# Patient Record
Sex: FEMALE | Race: WHITE | NOT HISPANIC OR LATINO | Employment: FULL TIME | URBAN - METROPOLITAN AREA
[De-identification: names, ages, dates, MRNs, and addresses within clinical notes are randomized per-mention and may not be internally consistent; named-entity substitution may affect disease eponyms.]

---

## 2017-08-02 ENCOUNTER — ALLSCRIPTS OFFICE VISIT (OUTPATIENT)
Dept: OTHER | Facility: OTHER | Age: 27
End: 2017-08-02

## 2017-10-18 LAB — EXTERNAL HIV SCREEN: NORMAL

## 2018-01-14 VITALS
BODY MASS INDEX: 30.4 KG/M2 | SYSTOLIC BLOOD PRESSURE: 122 MMHG | HEART RATE: 100 BPM | HEIGHT: 66 IN | WEIGHT: 189.13 LBS | DIASTOLIC BLOOD PRESSURE: 70 MMHG | RESPIRATION RATE: 16 BRPM

## 2018-12-27 ENCOUNTER — OFFICE VISIT (OUTPATIENT)
Dept: URGENT CARE | Facility: MEDICAL CENTER | Age: 28
End: 2018-12-27
Payer: COMMERCIAL

## 2018-12-27 VITALS
DIASTOLIC BLOOD PRESSURE: 60 MMHG | TEMPERATURE: 99.2 F | HEIGHT: 65 IN | BODY MASS INDEX: 29.16 KG/M2 | SYSTOLIC BLOOD PRESSURE: 110 MMHG | HEART RATE: 124 BPM | RESPIRATION RATE: 20 BRPM | OXYGEN SATURATION: 98 % | WEIGHT: 175 LBS

## 2018-12-27 DIAGNOSIS — J06.9 UPPER RESPIRATORY TRACT INFECTION, UNSPECIFIED TYPE: Primary | ICD-10-CM

## 2018-12-27 PROCEDURE — 99213 OFFICE O/P EST LOW 20 MIN: CPT | Performed by: PHYSICIAN ASSISTANT

## 2018-12-27 RX ORDER — NORETHINDRONE ACETATE AND ETHINYL ESTRADIOL 1MG-20(24)
1 KIT ORAL DAILY
Refills: 0 | COMMUNITY
Start: 2018-12-19

## 2018-12-27 RX ORDER — BENZONATATE 200 MG/1
200 CAPSULE ORAL 3 TIMES DAILY PRN
Qty: 20 CAPSULE | Refills: 0 | Status: SHIPPED | OUTPATIENT
Start: 2018-12-27

## 2018-12-27 NOTE — PATIENT INSTRUCTIONS
1  Motrin as needed for fever  2  Increase fluids  3  Take Tessalon every 8 hours as needed for cough  4   Follow up with PCP in 3-5 days if symptoms persist

## 2018-12-27 NOTE — PROGRESS NOTES
[facility]        NAME: Emmanuel Briggs is a 34 y o  female  : 1990    MRN: [id number]  DATE: 2018  TIME: 8:39 AM    Assessment and Plan   Upper respiratory tract infection, unspecified type [J06 9]  1  Upper respiratory tract infection, unspecified type  benzonatate (TESSALON) 200 MG capsule         Patient Instructions     1  Motrin as needed for fever  2  Increase fluids  3  Take Tessalon every 8 hours as needed for cough  4  Follow up with PCP in 3-5 days if symptoms persist     Chief Complaint     Chief Complaint   Patient presents with    URI     x3 days sinus pressure, cough , sore throat, runny nose and achy   phlgem is white         History of Present Illness       The patient is a 61-year-old female presents with a 3 day history of nasal discharge, cough, congestion and postnasal drip  She denies any fever, chills or body aches since the onset of her symptoms  Review of Systems   Review of Systems   Constitutional: Negative  HENT: Positive for congestion, postnasal drip, rhinorrhea and sinus pressure  Respiratory: Positive for cough  Gastrointestinal: Negative  Current Medications       Current Outpatient Prescriptions:     BLISOVI 24 FE 1-20 MG-MCG(24) per tablet, Take 1 tablet by mouth daily, Disp: , Rfl: 0    benzonatate (TESSALON) 200 MG capsule, Take 1 capsule (200 mg total) by mouth 3 (three) times a day as needed for cough, Disp: 20 capsule, Rfl: 0    Current Allergies     Allergies as of 2018 - Reviewed 2018   Allergen Reaction Noted    Cefaclor  2015    Ciprofloxacin  2015    Sulfa antibiotics  2012            The following portions of the patient's history were reviewed and updated as appropriate: allergies, current medications, past family history, past medical history, past social history, past surgical history and problem list      History reviewed  No pertinent past medical history  History reviewed   No pertinent surgical history  History reviewed  No pertinent family history  Medications have been verified  Objective   /60   Pulse (!) 124   Temp 99 2 °F (37 3 °C) (Temporal)   Resp 20   Ht 5' 5" (1 651 m)   Wt 79 4 kg (175 lb)   SpO2 98%   BMI 29 12 kg/m²        Physical Exam     Physical Exam   Constitutional: She appears well-developed and well-nourished  No distress  HENT:   Head: Normocephalic and atraumatic  Right Ear: Tympanic membrane and ear canal normal    Left Ear: Tympanic membrane and ear canal normal    Nose: Mucosal edema and rhinorrhea present  Mouth/Throat: Uvula is midline, oropharynx is clear and moist and mucous membranes are normal    Cardiovascular: Normal rate, regular rhythm and normal heart sounds  No murmur heard    Pulmonary/Chest: Effort normal and breath sounds normal

## 2019-11-20 NOTE — PROGRESS NOTES
Chart updated per office request  Discrete reportable data documented      Updated:    HIV Screening, DOS  10-18-17    Pap(s), DOS  10-18-17

## 2020-01-14 ENCOUNTER — OFFICE VISIT (OUTPATIENT)
Dept: URGENT CARE | Facility: CLINIC | Age: 30
End: 2020-01-14
Payer: COMMERCIAL

## 2020-01-14 VITALS
BODY MASS INDEX: 27.7 KG/M2 | DIASTOLIC BLOOD PRESSURE: 90 MMHG | RESPIRATION RATE: 16 BRPM | HEIGHT: 69 IN | TEMPERATURE: 98.8 F | SYSTOLIC BLOOD PRESSURE: 132 MMHG | OXYGEN SATURATION: 97 % | WEIGHT: 187 LBS | HEART RATE: 120 BPM

## 2020-01-14 DIAGNOSIS — J01.00 ACUTE MAXILLARY SINUSITIS, RECURRENCE NOT SPECIFIED: Primary | ICD-10-CM

## 2020-01-14 PROCEDURE — 99213 OFFICE O/P EST LOW 20 MIN: CPT | Performed by: PHYSICIAN ASSISTANT

## 2020-01-14 RX ORDER — BROMPHENIRAMINE MALEATE, PSEUDOEPHEDRINE HYDROCHLORIDE, AND DEXTROMETHORPHAN HYDROBROMIDE 2; 30; 10 MG/5ML; MG/5ML; MG/5ML
10 SYRUP ORAL 4 TIMES DAILY PRN
Qty: 118 ML | Refills: 0 | Status: SHIPPED | OUTPATIENT
Start: 2020-01-14

## 2020-01-14 RX ORDER — AMOXICILLIN AND CLAVULANATE POTASSIUM 875; 125 MG/1; MG/1
1 TABLET, FILM COATED ORAL EVERY 12 HOURS SCHEDULED
Qty: 14 TABLET | Refills: 0 | Status: SHIPPED | OUTPATIENT
Start: 2020-01-14 | End: 2020-01-21

## 2020-01-14 NOTE — PROGRESS NOTES
[facility]        NAME: Jose Luis Kern is a 34 y o  female  : 1990    MRN: [id number]  DATE: 2020  TIME: 7:13 PM    Assessment and Plan   Acute maxillary sinusitis, recurrence not specified [J01 00]  1  Acute maxillary sinusitis, recurrence not specified  amoxicillin-clavulanate (AUGMENTIN) 875-125 mg per tablet    brompheniramine-pseudoephedrine-DM 30-2-10 MG/5ML syrup         Patient Instructions   The patient does not want influenza testing  Prescription sent to the pharmacy for Augmentin and Bromfed-use as directed  Nasal spray daily, Flonase a m  mist humidifier, Tylenol/ibuprofen as needed for fever pain  Follow up with PCP in 3-5 days  Proceed to  ER if symptoms worsen  Chief Complaint     Chief Complaint   Patient presents with    Flu Symptoms     pt reports having flu like symptoms that started on SAt- she reports having a bad cough iwth body aches and possible fever- and sore throat         History of Present Illness   The patient is a 53-year-old female who presents with cold symptoms that started 3-4 days ago  Gradual onset of symptoms  Positive headache  Positive nasal and sinus congestion that has worsened  Frontal and maxillary sinus pain and pressure  Bilateral ear pain without drainage or hearing changes  Negative fever  Positive myalgias  Positive sore throat  Nonproductive cough without shortness of breath or wheezing  She did not receive her influenza vaccine this season  HPI    Review of Systems   Review of Systems   Constitutional: Negative for activity change, chills, fatigue and fever  HENT: Positive for congestion, postnasal drip, rhinorrhea, sinus pressure, sinus pain and sore throat  Negative for ear discharge, ear pain, facial swelling, mouth sores, sneezing and trouble swallowing  Eyes: Negative for pain, discharge, redness and itching  Respiratory: Positive for cough   Negative for apnea, chest tightness, shortness of breath, wheezing and stridor  Cardiovascular: Negative for chest pain  Gastrointestinal: Negative for abdominal distention, abdominal pain, diarrhea, nausea and vomiting  Genitourinary: Negative for difficulty urinating  Musculoskeletal: Positive for myalgias  Negative for arthralgias  Skin: Negative for pallor and rash  Allergic/Immunologic: Negative  Neurological: Positive for headaches  Negative for dizziness and light-headedness  Hematological: Negative  Psychiatric/Behavioral: Negative  All other systems reviewed and are negative  Current Medications       Current Outpatient Medications:     BLISOVI 24 FE 1-20 MG-MCG(24) per tablet, Take 1 tablet by mouth daily, Disp: , Rfl: 0    amoxicillin-clavulanate (AUGMENTIN) 875-125 mg per tablet, Take 1 tablet by mouth every 12 (twelve) hours for 7 days, Disp: 14 tablet, Rfl: 0    benzonatate (TESSALON) 200 MG capsule, Take 1 capsule (200 mg total) by mouth 3 (three) times a day as needed for cough (Patient not taking: Reported on 1/14/2020), Disp: 20 capsule, Rfl: 0    brompheniramine-pseudoephedrine-DM 30-2-10 MG/5ML syrup, Take 10 mL by mouth 4 (four) times a day as needed for congestion, Disp: 118 mL, Rfl: 0    Current Allergies     Allergies as of 01/14/2020 - Reviewed 01/14/2020   Allergen Reaction Noted    Cefaclor  03/12/2015    Ciprofloxacin  03/12/2015    Sulfa antibiotics  03/08/2012            The following portions of the patient's history were reviewed and updated as appropriate: allergies, current medications, past family history, past medical history, past social history, past surgical history and problem list      History reviewed  No pertinent past medical history  History reviewed  No pertinent surgical history  No family history on file  Medications have been verified          Objective   /90   Pulse (!) 120   Temp 98 8 °F (37 1 °C)   Resp 16   Ht 5' 9" (1 753 m)   Wt 84 8 kg (187 lb)   LMP 01/11/2020 SpO2 97%   BMI 27 62 kg/m²        Physical Exam     Physical Exam   Constitutional: She is oriented to person, place, and time  She appears well-developed and well-nourished  Non-toxic appearance  She does not have a sickly appearance  She appears ill  No distress  HENT:   Head: Normocephalic and atraumatic  Right Ear: Hearing, tympanic membrane, external ear and ear canal normal  No drainage or tenderness  Tympanic membrane is not perforated, not erythematous, not retracted and not bulging  No middle ear effusion  No decreased hearing is noted  Left Ear: Hearing, tympanic membrane, external ear and ear canal normal  No drainage or tenderness  Tympanic membrane is not perforated, not erythematous, not retracted and not bulging  No middle ear effusion  No decreased hearing is noted  Nose: Mucosal edema and rhinorrhea present  No septal deviation  Right sinus exhibits maxillary sinus tenderness and frontal sinus tenderness  Left sinus exhibits maxillary sinus tenderness and frontal sinus tenderness  Mouth/Throat: Uvula is midline and mucous membranes are normal  No oral lesions  No dental abscesses or uvula swelling  Posterior oropharyngeal erythema present  No oropharyngeal exudate, posterior oropharyngeal edema or tonsillar abscesses  No tonsillar exudate  Eyes: Pupils are equal, round, and reactive to light  Conjunctivae and EOM are normal    Neck: Trachea normal, normal range of motion and full passive range of motion without pain  Neck supple  No JVD present  No edema and no erythema present  No thyromegaly present  Cardiovascular: Normal rate, regular rhythm, S1 normal, S2 normal, normal heart sounds, intact distal pulses and normal pulses  No murmur heard  Pulmonary/Chest: Effort normal and breath sounds normal  No accessory muscle usage or stridor  No tachypnea  No respiratory distress  She has no decreased breath sounds  She has no wheezes  She has no rhonchi  She has no rales  Abdominal: Soft  Normal appearance and bowel sounds are normal  She exhibits no distension  There is no hepatosplenomegaly  There is no tenderness  Musculoskeletal: Normal range of motion  She exhibits no edema  Neurological: She is alert and oriented to person, place, and time  Skin: Skin is warm, dry and intact  No abrasion, no lesion and no rash noted  She is not diaphoretic  No cyanosis or erythema  Nails show no clubbing  Psychiatric: She has a normal mood and affect  Her speech is normal and behavior is normal    Nursing note and vitals reviewed

## 2020-01-14 NOTE — PATIENT INSTRUCTIONS
Prescription sent to the pharmacy for Augmentin and Bromfed-use as directed  Nasal spray daily, Flonase a m  mist humidifier, Tylenol/ibuprofen as needed for fever pain  Follow up with PCP in 3-5 days  Proceed to  ER if symptoms worsen  Rhinosinusitis   AMBULATORY CARE:   Rhinosinusitis (RS)  is inflammation of your nose and sinuses  It commonly begins as a virus, often as a common cold  Viruses usually last 7 to 10 days and do not need treatment  When the virus does not get better on its own, you may have bacterial RS  This means that bacteria have begun to grow inside your sinuses  Acute RS lasts less than 4 weeks  Chronic RS lasts 12 weeks or more  Recurrent RS is when you have 4 or more episodes of RS in one year  Your signs and symptoms  may be worse when you lie on your back or try to sleep  You may have any of the following:  · Stuffy nose and reduced sense of smell     · Runny nose with thick yellow or green mucus     · Pressure or pain on your face or a headache     · Pain in your teeth or bad breath     · Ear pain or pressure     · Fever or cough     · Tiredness  Seek care immediately if:   · You have double vision or you cannot see  · You have a stiff neck, a fever, or a bad headache  · Your eyeball bulges out or you cannot move your eye  · Your eye and eyelid are red, swollen, and painful  · You cannot open your eye  · You are more sleepy than normal, or you notice changes in your ability to think, move, or talk  · You have swelling of your forehead or scalp  Contact your healthcare provider if:   · Your symptoms are worse or do not improve after 3 to 5 days of treatment  · You have questions or concerns about your condition or care  Treatment for rhinosinusitis  may include any of the following:  · Acetaminophen  decreases pain and fever  It is available without a doctor's order  Ask how much to take and how often to take it  Follow directions   Acetaminophen can cause liver damage if not taken correctly  · NSAIDs , such as ibuprofen, help decrease swelling, pain, and fever  This medicine is available with or without a doctor's order  NSAIDs can cause stomach bleeding or kidney problems in certain people  If you take blood thinner medicine, always ask your healthcare provider if NSAIDs are safe for you  Always read the medicine label and follow directions  · Nasal steroid sprays  decrease inflammation in your nose and sinuses  · Decongestants  reduce swelling and drain mucus in the nose and sinuses  They may help you breathe easier  · Antihistamines  dry mucus in the nose and relieve sneezing  · Antibiotics  treat a bacterial infection and may be needed if your symptoms do not improve or they get worse  · Take your medicine as directed  Contact your healthcare provider if you think your medicine is not helping or if you have side effects  Tell him or her if you are allergic to any medicine  Keep a list of the medicines, vitamins, and herbs you take  Include the amounts, and when and why you take them  Bring the list or the pill bottles to follow-up visits  Carry your medicine list with you in case of an emergency  Self-care:   · Rinse your sinuses  Use a sinus rinse device to rinse your nasal passages with a saline (salt water) solution  This will help thin the mucus in your nose and rinse away pollen and dirt  It will also help reduce swelling so you can breathe normally  Ask your healthcare provider how often to do this  · Breathe in steam   Heat a bowl of water until you see steam  Lean over the bowl and make a tent over your head with a large towel  Breathe deeply for about 20 minutes  Be careful not to get too close to the steam or burn yourself  Do this 3 times a day  You can also breathe deeply when you take a hot shower  · Sleep with your head elevated    Place an extra pillow under your head before you go to sleep to help your sinuses drain      · Drink liquids as directed  Ask your healthcare provider how much liquid to drink each day and which liquids are best for you  Liquids will thin the mucus in your nose and help it drain  Avoid drinks that contain alcohol or caffeine  · Do not smoke, and avoid secondhand smoke  Nicotine and other chemicals in cigarettes and cigars can make your symptoms worse  Ask your healthcare provider for information if you currently smoke and need help to quit  E-cigarettes or smokeless tobacco still contain nicotine  Talk to your healthcare provider before you use these products  Follow up with your healthcare provider as directed: Follow up if your symptoms are worse or not better after 3 to 5 days of treatment  Write down your questions so you remember to ask them during your visits  © 2017 [facility] [facility] Information is for End User's use only and may not be sold, redistributed or otherwise used for commercial purposes  All illustrations and images included in CareNotes® are the copyrighted property of A D A M [employer] Inc  or [employer]  The above information is an  only  It is not intended as medical advice for individual conditions or treatments  Talk to your doctor, nurse or pharmacist before following any medical regimen to see if it is safe and effective for you

## 2021-08-17 ENCOUNTER — OFFICE VISIT (OUTPATIENT)
Dept: URGENT CARE | Facility: MEDICAL CENTER | Age: 31
End: 2021-08-17
Payer: COMMERCIAL

## 2021-08-17 VITALS
HEIGHT: 69 IN | HEART RATE: 104 BPM | BODY MASS INDEX: 27.62 KG/M2 | SYSTOLIC BLOOD PRESSURE: 118 MMHG | DIASTOLIC BLOOD PRESSURE: 74 MMHG | TEMPERATURE: 98.5 F | RESPIRATION RATE: 18 BRPM
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
## 2021-08-17 PROCEDURE — 90715 TDAP VACCINE 7 YRS/> IM: CPT

## 2021-08-17 PROCEDURE — 90471 IMMUNIZATION ADMIN: CPT | Performed by: PHYSICIAN ASSISTANT

## 2021-08-17 PROCEDURE — 99213 OFFICE O/P EST LOW 20 MIN: CPT | Performed by: PHYSICIAN ASSISTANT

## 2021-08-18 NOTE — PROGRESS NOTES
[facility]        NAME: Kenn Mejia is a 36 y o  female  : 1990    MRN: [id number]  DATE: 2021  TIME: 9:01 PM    Assessment and Plan   Cat bite, initial encounter Azalee Leventhal  1  Cat bite, initial encounter  Tdap Vaccine greater than or equal to 8yo      There is no puncture wound noted, only superficial scratch on the skin  No need for rabies vaccination at this time  Patient does want to update tetanus as she has not had one in a very long time  Patient Instructions     Tylenol or Motrin for pain   keep wound clean and dry  Follow up with PCP in 3-5 days  Proceed to  ER if symptoms worsen  Chief Complaint     Chief Complaint   Patient presents with    Animal Bite     cat bite to left hand         History of Present Illness        Patient is a 72-year-old female who presents today with complaints of cat bite to left hand that occurred yesterday  She denies any significant swelling or pain  Tetanus is not up-to-date and this is what patient came here for  No actual break in the skin, just superficial scratch  Review of Systems   Review of Systems   Constitutional: Negative for fever  Respiratory: Negative for shortness of breath  Cardiovascular: Negative for chest pain  Musculoskeletal: Positive for myalgias  Skin: Positive for wound  Negative for color change           Current Medications       Current Outpatient Medications:     BLISOVI 24 FE 1-20 MG-MCG(24) per tablet, Take 1 tablet by mouth daily, Disp: , Rfl: 0    benzonatate (TESSALON) 200 MG capsule, Take 1 capsule (200 mg total) by mouth 3 (three) times a day as needed for cough (Patient not taking: Reported on 2021), Disp: 20 capsule, Rfl: 0    brompheniramine-pseudoephedrine-DM 30-2-10 MG/5ML syrup, Take 10 mL by mouth 4 (four) times a day as needed for congestion (Patient not taking: Reported on 2021), Disp: 118 mL, Rfl: 0    Current Allergies     Allergies as of 2021 - Reviewed 08/17/2021   Allergen Reaction Noted    Cefaclor  03/12/2015    Ciprofloxacin  03/12/2015    Sulfa antibiotics  03/08/2012            The following portions of the patient's history were reviewed and updated as appropriate: allergies, current medications, past family history, past medical history, past social history, past surgical history and problem list      History reviewed  No pertinent past medical history  Past Surgical History:   Procedure Laterality Date    TONSILLECTOMY         Family History   Problem Relation Age of Onset    Hodgkin's lymphoma Brother          Medications have been verified  Objective   /74   Pulse 104   Temp 98 5 °F (36 9 °C)   Resp 18   Ht 5' 9" (1 753 m)   BMI 27 62 kg/m²        Physical Exam     Physical Exam  Constitutional:       General: She is not in acute distress  Appearance: Normal appearance  She is not ill-appearing  Cardiovascular:      Rate and Rhythm: Normal rate and regular rhythm  Pulmonary:      Effort: Pulmonary effort is normal    Musculoskeletal:         General: Normal range of motion  Skin:     General: Skin is warm and dry  Findings: Wound present  No erythema  Neurological:      Mental Status: She is alert

## 2024-09-18 ENCOUNTER — OFFICE VISIT (OUTPATIENT)
Age: 34
End: 2024-09-18
Payer: COMMERCIAL

## 2024-09-18 ENCOUNTER — PREP FOR PROCEDURE (OUTPATIENT)
Age: 34
End: 2024-09-18

## 2024-09-18 VITALS
DIASTOLIC BLOOD PRESSURE: 80 MMHG | HEART RATE: 85 BPM | HEIGHT: 66 IN | OXYGEN SATURATION: 96 % | SYSTOLIC BLOOD PRESSURE: 104 MMHG | WEIGHT: 174 LBS | BODY MASS INDEX: 27.97 KG/M2

## 2024-09-18 DIAGNOSIS — R10.9 ABDOMINAL PAIN, UNSPECIFIED ABDOMINAL LOCATION: ICD-10-CM

## 2024-09-18 DIAGNOSIS — K62.5 RECTAL BLEEDING: ICD-10-CM

## 2024-09-18 DIAGNOSIS — K59.00 CONSTIPATION, UNSPECIFIED CONSTIPATION TYPE: Primary | ICD-10-CM

## 2024-09-18 PROCEDURE — 99204 OFFICE O/P NEW MOD 45 MIN: CPT | Performed by: PHYSICIAN ASSISTANT

## 2024-09-18 RX ORDER — NORGESTIMATE AND ETHINYL ESTRADIOL 0.25-0.035
1 KIT ORAL DAILY
COMMUNITY
Start: 2024-09-04 | End: 2024-09-20

## 2024-09-18 NOTE — PATIENT INSTRUCTIONS
Scheduled date of colonoscopy (as of today): 10/4/24  Physician performing colonoscopy: Kellee  Location of colonoscopy: [place]  Bowel prep reviewed with patient: Miralax  Instructions reviewed with patient by: Nerissa JARAMILLO  Clearances:

## 2024-09-18 NOTE — PROGRESS NOTES
[facility] - Outpatient Consultation  Melvi Morrison 34 y.o. female MRN: [id number]  Encounter: [id number]          ASSESSMENT AND PLAN:      1. Constipation  2. Abdominal pain  3. Rectal bleeding    Patient presents for an evaluation of intermittent constipation, abdominal discomfort, and bloating.  She also has had episodes of rectal bleeding.  She had a recent normal CBC and TSH level.  She reports her father has had colon polyps.  She recently started a probiotic.    Will plan for a colonoscopy to investigate.  Will check celiac serology.  Recommend to begin a fiber supplement like Benefiber daily and continue the probiotic course.  Trial of a low FODMAP diet (information sheet provided to patient).      ______________________________________________________________________    HPI:  Patient is a very pleasant 34 year old female who presents to the office for a gastrointestinal evaluation.  Patient reports over the past 3 years or so she has noticed more issues with her bowel movements.  Over the past couple of months symptoms have increased.  She reports of intermittent constipation.  She reports she can have a number of days with regular bowel movements but then can have a period where she does not go for a few days.  She also reports of abdominal discomfort and bloating.  She has had rectal bleeding at times.  She has never had a colonoscopy.  She reports she was put on an antibiotic course for UTI recently.  She then started a probiotic.  She reports a history of hemorrhoids in the past.  She reports a father with colon polyps.  No family history of colon cancer.  No family history of ulcerative colitis, Crohn's disease, or celiac disease.  She reports she wishes to try to avoid any prescription medications.  She reports adequate water intake.    I discussed informed consent with the patient for the colonoscopy. The risks/benefits/alternatives of the procedure were discussed with the  "patient. Risks included, but not limited to, infection, bleeding, perforation, injury to organs in the abdomen were discussed. Patient was agreeable.       REVIEW OF SYSTEMS:    CONSTITUTIONAL: Denies any fever, chills, rigors, and weight loss.  HEENT: No earache or tinnitus. Denies hearing loss or visual disturbances.  CARDIOVASCULAR: No chest pain or palpitations.   RESPIRATORY: Denies any cough, hemoptysis, shortness of breath or dyspnea on exertion.  GASTROINTESTINAL: As noted in the History of Present Illness.   GENITOURINARY: No problems with urination. Denies any hematuria or dysuria.  NEUROLOGIC: No dizziness or vertigo, denies headaches.   MUSCULOSKELETAL: Denies any muscle or joint pain.   SKIN: Denies skin rashes or itching.   ENDOCRINE: Denies excessive thirst. Denies intolerance to heat or cold.  PSYCHOSOCIAL: Denies depression or anxiety. Denies any recent memory loss.       Historical Information   History reviewed. No pertinent past medical history.  Past Surgical History:   Procedure Laterality Date    RETINAL DETACHMENT REPAIR W/ SCLERAL BUCKLE LE Right 2020    TONSILLECTOMY       Social History   Social History     Substance and Sexual Activity   Alcohol Use Never     Social History     Substance and Sexual Activity   Drug Use Never     Social History     Tobacco Use   Smoking Status Never   Smokeless Tobacco Never     Family History   Problem Relation Age of Onset    Hodgkin's lymphoma Brother        Meds/Allergies       Current Outpatient Medications:     benzonatate (TESSALON) 200 MG capsule    brompheniramine-pseudoephedrine-DM 30-2-10 MG/5ML syrup    norgestimate-ethinyl estradiol (ORTHO-CYCLEN) 0.25-35 MG-MCG per tablet    Allergies   Allergen Reactions    Cefaclor      Other reaction(s): hives    Ciprofloxacin      Other reaction(s): hives    Sulfa Antibiotics            Objective     Blood pressure 104/80, pulse 85, height 5' 6\" (1.676 m), weight 78.9 kg (174 lb), SpO2 96%. Body mass index " is 28.08 kg/m².        PHYSICAL EXAM:      General Appearance:   Alert, cooperative, no distress   HEENT:   Normocephalic, atraumatic, anicteric.     Neck:  Supple, symmetrical, trachea midline   Lungs:   Clear to auscultation bilaterally; no rales, rhonchi or wheezing; respirations unlabored    Heart::   Regular rate and rhythm; no murmur, rub, or gallop.   Abdomen:   Soft, non-tender, non-distended; normal bowel sounds; no masses, no organomegaly    Genitalia:   Deferred    Rectal:   Deferred    Extremities:  No cyanosis, clubbing or edema    Pulses:  2+ and symmetric    Skin:  No jaundice, rashes, or lesions    Lymph nodes:  No palpable cervical lymphadenopathy        Lab Results:   No visits with results within 1 Day(s) from this visit.   Latest known visit with results is:   Orders Only on 11/20/2019   Component Date Value    HIV Screen 10/18/2017 Non-Reactive          Radiology Results:   No results found.

## 2024-10-02 ENCOUNTER — APPOINTMENT (EMERGENCY)
Dept: RADIOLOGY | Facility: HOSPITAL | Age: 34
End: 2024-10-02
Payer: COMMERCIAL

## 2024-10-02 ENCOUNTER — TELEPHONE (OUTPATIENT)
Age: 34
End: 2024-10-02

## 2024-10-02 ENCOUNTER — HOSPITAL ENCOUNTER (EMERGENCY)
Facility: HOSPITAL | Age: 34
Discharge: HOME | End: 2024-10-02
Payer: COMMERCIAL

## 2024-10-02 VITALS
RESPIRATION RATE: 18 BRPM | DIASTOLIC BLOOD PRESSURE: 98 MMHG | SYSTOLIC BLOOD PRESSURE: 143 MMHG | TEMPERATURE: 98 F | HEART RATE: 93 BPM

## 2024-10-02 DIAGNOSIS — Z23 NEED FOR RABIES VACCINATION: ICD-10-CM
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
## 2024-10-02 PROCEDURE — 90375 RABIES IG IM/SC: CPT

## 2024-10-02 PROCEDURE — 99284 EMERGENCY DEPT VISIT MOD MDM: CPT

## 2024-10-02 PROCEDURE — 99283 EMERGENCY DEPT VISIT LOW MDM: CPT

## 2024-10-02 PROCEDURE — 90471 IMMUNIZATION ADMIN: CPT

## 2024-10-02 PROCEDURE — 96372 THER/PROPH/DIAG INJ SC/IM: CPT

## 2024-10-02 PROCEDURE — 90675 RABIES VACCINE IM: CPT

## 2024-10-02 PROCEDURE — 73090 X-RAY EXAM OF FOREARM: CPT

## 2024-10-02 RX ORDER — ONDANSETRON 4 MG/1
4 TABLET, FILM COATED ORAL EVERY 6 HOURS
Qty: 12 TABLET | Refills: 0 | Status: SHIPPED | OUTPATIENT
Start: 2024-10-02

## 2024-10-02 RX ADMIN — AMOXICILLIN AND CLAVULANATE POTASSIUM 1 TABLET: 875; 125 TABLET, FILM COATED ORAL at 01:21

## 2024-10-02 RX ADMIN — RABIES VIRUS STRAIN PM-1503-3M ANTIGEN (PROPIOLACTONE INACTIVATED) AND WATER 1 ML: KIT at 01:12

## 2024-10-02 RX ADMIN — RABIES IMMUNE GLOBULIN (HUMAN) 1500 UNITS: 300 INJECTION, SOLUTION INFILTRATION; INTRAMUSCULAR at 01:12

## 2024-10-02 NOTE — DISCHARGE INSTRUCTIONS
Keep wound covered with gauze wrap.     If noticing redness, swelling, pus discharge from wound, or swelling of fingers, inability to move fingers, or redness tracking up the arm, return to the ED for evaluation immediately as these are signs of infection.    Take antibiotic as prescribed with food.    Tylenol and Motrin for pain control.    Return on days 3,7,14 for repeat rabies vaccinations: 10/5, 10/9, 10/16

## 2024-10-02 NOTE — TELEPHONE ENCOUNTER
Rescheduled date of colonoscopy (as of today): 11/01/2024  Physician performing colonoscopy: DR CHAVEZ  Location of colonoscopy: [place]  Bowel prep reviewed with patient: MIRALAX/DULCOLAX  Instructions reviewed with patient by: Previously reviewed with pt. Verified pt has all procedure instructions.  Clearances:         Patient rescheduled procedure due to recent cat bite. She is currently on antibiotics.

## 2024-10-02 NOTE — ED PROVIDER NOTES
Final diagnoses:   Cat bite, initial encounter   Need for rabies vaccination     ED Disposition       ED Disposition   Discharge    Condition   Stable    Date/Time   Wed Oct 2, 2024  1:25 AM    Comment   Melvi Morrison discharge to home/self care.                   Assessment & Plan       Medical Decision Making  Patient is a 34-year-old female presenting to the ED after being bitten by a feral cat on the right wrist.  History and clinical exam documented below.  Patient's wound was irrigated copiously with 1.5 L of saline.  Given there is no information on the vaccination status of the cat, advised vaccination with rabies series.  Tetanus status is up-to-date.  Patient is agreeable with the plan.  Will also treat the patient with Augmentin for infection prophylaxis.  An x-ray of the right forearm was taken to rule out any foreign bodies, and was ultimately negative.  Patient offered pain medication but declined in the ED.  Wounds were dressed with sterile gauze and Kerlix wrap.  Patient was counseled on cat bites being high infection risk.  Given strict return precautions and instructed on monitoring for infection.  Patient to return to the ED for repeat rabies vaccine on days 3, 7, 14. Rx Augmentin and Zofran.    I reviewed all testing with the patient:  I gave oral return precautions for what to return for in addition to the written return precautions.   The patient verbalized understanding of the discharge instructions and warnings that would necessitate return to the Emergency Department.  I specifically highlighted areas of special concern regarding the written and verbal discharge instructions and return precautions.    All questions were answered prior to discharge.      Amount and/or Complexity of Data Reviewed  Radiology: ordered and independent interpretation performed.    Risk  Prescription drug management.             Medications   rabies immune globulin, human (HyperRAB) injection 1,500 Units (1,500 Units  Infiltration Given 10/2/24 0112)   rabies vaccine, human diploid IM injection 1 mL (1 mL Intramuscular Given 10/2/24 0112)   amoxicillin-clavulanate (AUGMENTIN) 875-125 mg per tablet 1 tablet (1 tablet Oral Given 10/2/24 0121)       ED Risk Strat Scores                           SBIRT 20yo+      Flowsheet Row Most Recent Value   Initial Alcohol Screen: US AUDIT-C     1. How often do you have a drink containing alcohol? 0 Filed at: 10/02/2024 0055   2. How many drinks containing alcohol do you have on a typical day you are drinking?  0 Filed at: 10/02/2024 0055   3b. FEMALE Any Age, or MALE 65+: How often do you have 4 or more drinks on one occassion? 0 Filed at: 10/02/2024 0055   Audit-C Score 0 Filed at: 10/02/2024 0055   CLARA: How many times in the past year have you...    Used an illegal drug or used a prescription medication for non-medical reasons? Never Filed at: 10/02/2024 0055                            History of Present Illness       Chief Complaint   Patient presents with    Cat Bite     Bitten by cat at 10pm last night        History reviewed. No pertinent past medical history.   Past Surgical History:   Procedure Laterality Date    RETINAL DETACHMENT REPAIR W/ SCLERAL BUCKLE LE Right 2020    TONSILLECTOMY        Family History   Problem Relation Age of Onset    Hodgkin's lymphoma Brother       Social History     Tobacco Use    Smoking status: Never    Smokeless tobacco: Never   Vaping Use    Vaping status: Never Used   Substance Use Topics    Alcohol use: Never    Drug use: Never      E-Cigarette/Vaping    E-Cigarette Use Never User       E-Cigarette/Vaping Substances    Nicotine No     THC No     CBD No     Flavoring No     Other No     Unknown No       I have reviewed and agree with the history as documented.     HPI    Patient is a 34-year-old female with no significant past medical history, presenting to the ED for evaluation after being bitten by a feral cat about 1 hour prior to presentation.   Patient states that she was trying to rescue the, got into a cage, but that she was trying to reposition it, the cat bit her on the right volar wrist.  It subsequently ran away.  Patient has no information about who the cats owner is or whether or not is up-to-date on vaccinations.  Patient now reports some mild pain to the right wrist.  Denies fevers, chills.  No difficulty with movements of the right hand.  Patient is right-hand dominant.  Patient's tetanus status is up-to-date.    Review of Systems   All other systems reviewed and are negative.          Objective       ED Triage Vitals   Temperature Pulse Blood Pressure Respirations SpO2 Patient Position - Orthostatic VS   10/02/24 0057 10/02/24 0056 10/02/24 0056 10/02/24 0056 -- 10/02/24 0056   98 °F (36.7 °C) 93 143/98 18  Sitting      Temp Source Heart Rate Source BP Location FiO2 (%) Pain Score    10/02/24 0056 10/02/24 0056 10/02/24 0056 -- --    Oral Monitor Right arm        Vitals      Date and Time Temp Pulse SpO2 Resp BP Pain Score FACES Pain Rating User   10/02/24 0057 98 °F (36.7 °C) -- -- -- -- -- -- RD   10/02/24 0056 -- 93 -- 18 143/98 -- -- RD            Physical Exam  Vitals and nursing note reviewed.   Constitutional:       General: She is not in acute distress.     Appearance: Normal appearance. She is well-developed and normal weight. She is not ill-appearing, toxic-appearing or diaphoretic.   HENT:      Head: Normocephalic and atraumatic.      Right Ear: External ear normal.      Left Ear: External ear normal.      Nose: Nose normal. No congestion.      Mouth/Throat:      Mouth: Mucous membranes are moist.      Pharynx: Oropharynx is clear.   Eyes:      Conjunctiva/sclera: Conjunctivae normal.   Cardiovascular:      Rate and Rhythm: Normal rate and regular rhythm.   Pulmonary:      Effort: Pulmonary effort is normal. No respiratory distress.   Abdominal:      Palpations: Abdomen is soft.   Musculoskeletal:         General: No swelling.       Cervical back: Normal range of motion and neck supple.      Comments: Patient has 4-5 puncture wounds of the R volar wrist with mild soft tissue swelling at the distal radius. No fluctuance or induration. The area is not hot. There is no discharge from the area. Mild tenderness to palpation over the wound site.       Patient has no swelling of the fingers of the R hand. FDS and FDP intact of all fingers. Normal sensation in all relevant dermatomal distribution of the R hand. Normal thumb flexion, extension and opposition.    Able to flex and extend fingers without difficulty.    No evidence of lymphangitic streaking.     Skin:     General: Skin is warm and dry.      Capillary Refill: Capillary refill takes less than 2 seconds.   Neurological:      General: No focal deficit present.      Mental Status: She is alert and oriented to person, place, and time.      Sensory: No sensory deficit.      Motor: No weakness.      Gait: Gait normal.   Psychiatric:         Mood and Affect: Mood normal.         Results Reviewed       None            XR forearm 2 views RIGHT   ED Interpretation by Terrence Myers DO (10/02 0123)   No foreign body, no SQ gas as interpreted by me            Procedures    ED Medication and Procedure Management   Prior to Admission Medications   Prescriptions Last Dose Informant Patient Reported? Taking?   Norethin Ace-Eth Estrad-FE (BLISOVI 24 FE PO)   Yes No   Sig: Take by mouth      Facility-Administered Medications: None     Discharge Medication List as of 10/2/2024  1:34 AM        START taking these medications    Details   amoxicillin-clavulanate (AUGMENTIN) 875-125 mg per tablet Take 1 tablet by mouth every 12 (twelve) hours for 7 days, Starting Wed 10/2/2024, Until Wed 10/9/2024, Normal      ondansetron (ZOFRAN) 4 mg tablet Take 1 tablet (4 mg total) by mouth every 6 (six) hours, Starting Wed 10/2/2024, Normal           CONTINUE these medications which have NOT CHANGED    Details   Norethin  Ace-Eth Estrad-FE (BLISOVI 24 FE PO) Take by mouth, Historical Med           No discharge procedures on file.  ED SEPSIS DOCUMENTATION   Time reflects when diagnosis was documented in both MDM as applicable and the Disposition within this note       Time User Action Codes Description Comment    10/2/2024  1:25 AM Terrence Myers [W55.01XA] Cat bite, initial encounter     10/2/2024  1:25 AM Terrence Myers [[id number]] Need for rabies vaccination                  Terrence Myers,   10/02/24 0246

## 2024-10-03 ENCOUNTER — HOSPITAL ENCOUNTER (OUTPATIENT)
Facility: HOSPITAL | Age: 34
Setting detail: OBSERVATION
Discharge: HOME | End: 2024-10-04
Attending: EMERGENCY MEDICINE | Admitting: FAMILY MEDICINE
Payer: COMMERCIAL

## 2024-10-03 DIAGNOSIS — B37.9 YEAST INFECTION: ICD-10-CM

## 2024-10-03 DIAGNOSIS — L03.90 CELLULITIS: Primary | ICD-10-CM

## 2024-10-03 DIAGNOSIS — L03.113 CELLULITIS OF RIGHT UPPER EXTREMITY: ICD-10-CM

## 2024-10-03 PROBLEM — R87.613 HGSIL (HIGH GRADE SQUAMOUS INTRAEPITHELIAL LESION) ON PAP SMEAR OF CERVIX: Status: ACTIVE | Noted: 2023-07-28

## 2024-10-03 LAB
ALBUMIN SERPL BCG-MCNC: 4.6 G/DL (ref 3.5–5)
ALP SERPL-CCNC: 43 U/L (ref 34–104)
ALT SERPL W P-5'-P-CCNC: 10 U/L (ref 7–52)
ANION GAP SERPL CALCULATED.3IONS-SCNC: 10 MMOL/L (ref 4–13)
AST SERPL W P-5'-P-CCNC: 12 U/L (ref 13–39)
BASOPHILS # BLD AUTO: 0.02 THOUSANDS/ΜL (ref 0–0.1)
BASOPHILS NFR BLD AUTO: 0 % (ref 0–1)
BILIRUB SERPL-MCNC: 0.6 MG/DL (ref 0.2–1)
BUN SERPL-MCNC: 11 MG/DL (ref 5–25)
CALCIUM SERPL-MCNC: 9.4 MG/DL (ref 8.4–10.2)
CHLORIDE SERPL-SCNC: 104 MMOL/L (ref 96–108)
CO2 SERPL-SCNC: 24 MMOL/L (ref 21–32)
CREAT SERPL-MCNC: 0.76 MG/DL (ref 0.6–1.3)
EOSINOPHIL # BLD AUTO: 0.03 THOUSAND/ΜL (ref 0–0.61)
EOSINOPHIL NFR BLD AUTO: 0 % (ref 0–6)
ERYTHROCYTE [DISTWIDTH] IN BLOOD BY AUTOMATED COUNT: 12 % (ref 11.6–15.1)
GFR SERPL CREATININE-BSD FRML MDRD: 104 ML/MIN/1.73SQ M
GLUCOSE SERPL-MCNC: 85 MG/DL (ref 65–140)
HCG SERPL QL: NEGATIVE
HCT VFR BLD AUTO: 44.3 % (ref 34.8–46.1)
HGB BLD-MCNC: 14.5 G/DL (ref 11.5–15.4)
IMM GRANULOCYTES # BLD AUTO: 0.02 THOUSAND/UL (ref 0–0.2)
IMM GRANULOCYTES NFR BLD AUTO: 0 % (ref 0–2)
LACTATE SERPL-SCNC: 0.8 MMOL/L (ref 0.5–2)
LYMPHOCYTES # BLD AUTO: 1.61 THOUSANDS/ΜL (ref 0.6–4.47)
LYMPHOCYTES NFR BLD AUTO: 22 % (ref 14–44)
MCH RBC QN AUTO: 29.2 PG (ref 26.8–34.3)
MCHC RBC AUTO-ENTMCNC: 32.7 G/DL (ref 31.4–37.4)
MCV RBC AUTO: 89 FL (ref 82–98)
MONOCYTES # BLD AUTO: 0.57 THOUSAND/ΜL (ref 0.17–1.22)
MONOCYTES NFR BLD AUTO: 8 % (ref 4–12)
NEUTROPHILS # BLD AUTO: 5.12 THOUSANDS/ΜL (ref 1.85–7.62)
NEUTS SEG NFR BLD AUTO: 70 % (ref 43–75)
NRBC BLD AUTO-RTO: 0 /100 WBCS
PLATELET # BLD AUTO: 354 THOUSANDS/UL (ref 149–390)
PMV BLD AUTO: 8.9 FL (ref 8.9–12.7)
POTASSIUM SERPL-SCNC: 4 MMOL/L (ref 3.5–5.3)
PROCALCITONIN SERPL-MCNC: <0.05 NG/ML
PROT SERPL-MCNC: 8.3 G/DL (ref 6.4–8.4)
RBC # BLD AUTO: 4.97 MILLION/UL (ref 3.81–5.12)
SODIUM SERPL-SCNC: 138 MMOL/L (ref 135–147)
WBC # BLD AUTO: 7.37 THOUSAND/UL (ref 4.31–10.16)

## 2024-10-03 PROCEDURE — 84703 CHORIONIC GONADOTROPIN ASSAY: CPT | Performed by: PHYSICIAN ASSISTANT

## 2024-10-03 PROCEDURE — 36415 COLL VENOUS BLD VENIPUNCTURE: CPT | Performed by: EMERGENCY MEDICINE

## 2024-10-03 PROCEDURE — 96365 THER/PROPH/DIAG IV INF INIT: CPT

## 2024-10-03 PROCEDURE — 80053 COMPREHEN METABOLIC PANEL: CPT | Performed by: EMERGENCY MEDICINE

## 2024-10-03 PROCEDURE — 85025 COMPLETE CBC W/AUTO DIFF WBC: CPT | Performed by: EMERGENCY MEDICINE

## 2024-10-03 PROCEDURE — 99283 EMERGENCY DEPT VISIT LOW MDM: CPT

## 2024-10-03 PROCEDURE — 99222 1ST HOSP IP/OBS MODERATE 55: CPT | Performed by: PHYSICIAN ASSISTANT

## 2024-10-03 PROCEDURE — 96375 TX/PRO/DX INJ NEW DRUG ADDON: CPT

## 2024-10-03 PROCEDURE — 87040 BLOOD CULTURE FOR BACTERIA: CPT | Performed by: EMERGENCY MEDICINE

## 2024-10-03 PROCEDURE — 84145 PROCALCITONIN (PCT): CPT | Performed by: EMERGENCY MEDICINE

## 2024-10-03 PROCEDURE — 99285 EMERGENCY DEPT VISIT HI MDM: CPT | Performed by: EMERGENCY MEDICINE

## 2024-10-03 PROCEDURE — 83605 ASSAY OF LACTIC ACID: CPT | Performed by: EMERGENCY MEDICINE

## 2024-10-03 RX ORDER — KETOROLAC TROMETHAMINE 30 MG/ML
15 INJECTION, SOLUTION INTRAMUSCULAR; INTRAVENOUS EVERY 6 HOURS PRN
Status: DISCONTINUED | OUTPATIENT
Start: 2024-10-04 | End: 2024-10-04 | Stop reason: HOSPADM

## 2024-10-03 RX ORDER — METRONIDAZOLE 500 MG/1
500 TABLET ORAL EVERY 8 HOURS SCHEDULED
Status: DISCONTINUED | OUTPATIENT
Start: 2024-10-04 | End: 2024-10-04

## 2024-10-03 RX ORDER — ACETAMINOPHEN 10 MG/ML
1000 INJECTION, SOLUTION INTRAVENOUS ONCE
Status: COMPLETED | OUTPATIENT
Start: 2024-10-03 | End: 2024-10-03

## 2024-10-03 RX ORDER — MAGNESIUM HYDROXIDE/ALUMINUM HYDROXICE/SIMETHICONE 120; 1200; 1200 MG/30ML; MG/30ML; MG/30ML
30 SUSPENSION ORAL EVERY 6 HOURS PRN
Status: DISCONTINUED | OUTPATIENT
Start: 2024-10-03 | End: 2024-10-04 | Stop reason: HOSPADM

## 2024-10-03 RX ORDER — ACETAMINOPHEN 325 MG/1
650 TABLET ORAL EVERY 6 HOURS PRN
Status: DISCONTINUED | OUTPATIENT
Start: 2024-10-03 | End: 2024-10-04 | Stop reason: HOSPADM

## 2024-10-03 RX ORDER — ENOXAPARIN SODIUM 100 MG/ML
40 INJECTION SUBCUTANEOUS DAILY
Status: DISCONTINUED | OUTPATIENT
Start: 2024-10-04 | End: 2024-10-04 | Stop reason: HOSPADM

## 2024-10-03 RX ORDER — DIPHENHYDRAMINE HCL 25 MG
25 TABLET ORAL EVERY 6 HOURS PRN
Status: DISCONTINUED | OUTPATIENT
Start: 2024-10-03 | End: 2024-10-04 | Stop reason: HOSPADM

## 2024-10-03 RX ORDER — KETOROLAC TROMETHAMINE 30 MG/ML
15 INJECTION, SOLUTION INTRAMUSCULAR; INTRAVENOUS ONCE
Status: COMPLETED | OUTPATIENT
Start: 2024-10-03 | End: 2024-10-03

## 2024-10-03 RX ADMIN — MORPHINE SULFATE 2 MG: 2 INJECTION, SOLUTION INTRAMUSCULAR; INTRAVENOUS at 20:13

## 2024-10-03 RX ADMIN — DIPHENHYDRAMINE HYDROCHLORIDE 25 MG: 25 TABLET ORAL at 22:06

## 2024-10-03 RX ADMIN — ACETAMINOPHEN 1000 MG: 1000 INJECTION, SOLUTION INTRAVENOUS at 20:13

## 2024-10-03 RX ADMIN — KETOROLAC TROMETHAMINE 15 MG: 30 INJECTION, SOLUTION INTRAMUSCULAR; INTRAVENOUS at 20:13

## 2024-10-03 RX ADMIN — AMPICILLIN SODIUM AND SULBACTAM SODIUM 3 G: 100; 50 INJECTION, POWDER, FOR SOLUTION INTRAVENOUS at 18:53

## 2024-10-03 NOTE — ED PROVIDER NOTES
Final diagnoses:   Cellulitis     ED Disposition       ED Disposition   Admit    Condition   Stable    Date/Time   Thu Oct 3, 2024  7:42 PM    Comment   Case was discussed with Dr. Hanson and the patient's admission status was agreed to be Admission Status: observation status to the service of Dr. Hanson   .               Assessment & Plan       Medical Decision Making  Patient is a 34-year-old female no past medical history presenting for cellulitis.  Patient is well-appearing at bedside with stable vitals and in no acute distress.  She does have erythema, increased warmth and tenderness surrounding bite site which seems to be extending past the border that she states she sharda yesterday.  As she has been on 48 hours of antibiotics and has worsening symptoms will admit for failure of outpatient antibiotics.    Amount and/or Complexity of Data Reviewed  Labs: ordered.    Risk  Prescription drug management.  Decision regarding hospitalization.             Medications   ampicillin-sulbactam (UNASYN) 3 g in sodium chloride 0.9 % 100 mL IVPB (0 g Intravenous Stopped 10/3/24 1930)   acetaminophen (Ofirmev) injection 1,000 mg (1,000 mg Intravenous New Bag 10/3/24 2013)   ketorolac (TORADOL) injection 15 mg (15 mg Intravenous Given 10/3/24 2013)   morphine injection 2 mg (2 mg Intravenous Given 10/3/24 2013)       ED Risk Strat Scores                           SBIRT 22yo+      Flowsheet Row Most Recent Value   Initial Alcohol Screen: US AUDIT-C     1. How often do you have a drink containing alcohol? 0 Filed at: 10/03/2024 1930   2. How many drinks containing alcohol do you have on a typical day you are drinking?  0 Filed at: 10/03/2024 1930   3b. FEMALE Any Age, or MALE 65+: How often do you have 4 or more drinks on one occassion? 0 Filed at: 10/03/2024 1930   Audit-C Score 0 Filed at: 10/03/2024 1930   CLARA: How many times in the past year have you...    Used an illegal drug or used a prescription medication for  non-medical reasons? Never Filed at: 10/03/2024 1930                            History of Present Illness       Chief Complaint   Patient presents with    Wound Check     Pt arrives c/o drainage and redness to previous cat bite. Pt seen in ED and prescribed antibiotics. Denies fevers       History reviewed. No pertinent past medical history.   Past Surgical History:   Procedure Laterality Date    RETINAL DETACHMENT REPAIR W/ SCLERAL BUCKLE LE Right 2020    TONSILLECTOMY        Family History   Problem Relation Age of Onset    Hodgkin's lymphoma Brother       Social History     Tobacco Use    Smoking status: Never    Smokeless tobacco: Never   Vaping Use    Vaping status: Never Used   Substance Use Topics    Alcohol use: Never    Drug use: Never      E-Cigarette/Vaping    E-Cigarette Use Never User       E-Cigarette/Vaping Substances    Nicotine No     THC No     CBD No     Flavoring No     Other No     Unknown No       I have reviewed and agree with the history as documented.     Patient is a 34-year-old female no past medical history presenting for wound evaluation.  Patient had cat bite that she sustained to the right forearm 2 days ago and was seen here and treated with Augmentin.  She states that she has been compliant has had 4 total doses but states that she sharda a line around the redness yesterday to her arm and noticed that is spreading today and that it is now draining purulent drainage and with increased pain.  Notes tingling with position changes at her wrist.  Denies any fevers.  Did not take any pain medication today.        Review of Systems   All other systems reviewed and are negative.          Objective       ED Triage Vitals   Temperature Pulse Blood Pressure Respirations SpO2 Patient Position - Orthostatic VS   10/03/24 1737 10/03/24 1737 10/03/24 1737 10/03/24 1737 10/03/24 1737 10/03/24 1737   98 °F (36.7 °C) 98 124/65 18 99 % Sitting      Temp Source Heart Rate Source BP Location FiO2 (%) Pain  Score    10/03/24 1737 10/03/24 1737 10/03/24 1737 -- 10/03/24 1929    Temporal Monitor Left arm  6      Vitals      Date and Time Temp Pulse SpO2 Resp BP Pain Score FACES Pain Rating User   10/03/24 2100 98.6 °F (37 °C) -- -- -- 111/73 -- -- DII   10/03/24 2013 -- 74 98 % 20 130/85 6 -- SH   10/03/24 1929 -- 79 97 % 20 116/72 6 -- SH   10/03/24 1737 98 °F (36.7 °C) 98 99 % 18 124/65 -- -- RO            Physical Exam  Vitals reviewed.   Constitutional:       General: She is not in acute distress.     Appearance: Normal appearance. She is not ill-appearing.   HENT:      Mouth/Throat:      Mouth: Mucous membranes are moist.   Eyes:      Conjunctiva/sclera: Conjunctivae normal.   Cardiovascular:      Rate and Rhythm: Normal rate and regular rhythm.      Pulses: Normal pulses.      Heart sounds: Normal heart sounds.   Pulmonary:      Effort: Pulmonary effort is normal.      Breath sounds: Normal breath sounds.   Abdominal:      Tenderness: There is no abdominal tenderness.   Musculoskeletal:         General: Tenderness present. No swelling. Normal range of motion.      Cervical back: Neck supple.      Comments: Erythema to the right lateral forearm surrounding puncture wounds from cat bite with mild purulent drainage, increased warmth, diffuse tenderness without drainable collections appreciated on exam, erythema extends from just proximal to the wrist up to nearly the elbow   Skin:     General: Skin is warm and dry.      Capillary Refill: Capillary refill takes less than 2 seconds.   Neurological:      General: No focal deficit present.      Mental Status: She is alert.      Sensory: No sensory deficit.      Motor: No weakness.   Psychiatric:         Mood and Affect: Mood normal.         Results Reviewed       Procedure Component Value Units Date/Time    Pregnancy Test (HCG Qualitative) [[id number]]  (Normal) Collected: 10/03/24 1848    Lab Status: Final result Specimen: Blood from Arm, Left Updated: 10/03/24 2031      Preg, Serum Negative    Lactic acid, plasma (w/reflex if result > 2.0) [[id number]]  (Normal) Collected: 10/03/24 1848    Lab Status: Final result Specimen: Blood from Arm, Left Updated: 10/03/24 1939     LACTIC ACID 0.8 mmol/L     Narrative:      Result may be elevated if tourniquet was used during collection.    Comprehensive metabolic panel [[id number]]  (Abnormal) Collected: 10/03/24 1848    Lab Status: Final result Specimen: Blood from Arm, Left Updated: 10/03/24 1936     Sodium 138 mmol/L      Potassium 4.0 mmol/L      Chloride 104 mmol/L      CO2 24 mmol/L      ANION GAP 10 mmol/L      BUN 11 mg/dL      Creatinine 0.76 mg/dL      Glucose 85 mg/dL      Calcium 9.4 mg/dL      AST 12 U/L      ALT 10 U/L      Alkaline Phosphatase 43 U/L      Total Protein 8.3 g/dL      Albumin 4.6 g/dL      Total Bilirubin 0.60 mg/dL      eGFR 104 ml/min/1.73sq m     Narrative:      [employer] guidelines for Chronic Kidney Disease (CKD):     Stage 1 with normal or high GFR (GFR > 90 mL/min/1.73 square meters)    Stage 2 Mild CKD (GFR = 60-89 mL/min/1.73 square meters)    Stage 3A Moderate CKD (GFR = 45-59 mL/min/1.73 square meters)    Stage 3B Moderate CKD (GFR = 30-44 mL/min/1.73 square meters)    Stage 4 Severe CKD (GFR = 15-29 mL/min/1.73 square meters)    Stage 5 End Stage CKD (GFR <15 mL/min/1.73 square meters)  Note: GFR calculation is accurate only with a steady state creatinine    Procalcitonin [[id number]]  (Normal) Collected: 10/03/24 1848    Lab Status: Final result Specimen: Blood from Arm, Left Updated: 10/03/24 1924     Procalcitonin <0.05 ng/ml     CBC and differential [[id number]] Collected: 10/03/24 1848    Lab Status: Final result Specimen: Blood from Arm, Left Updated: 10/03/24 1903     WBC 7.37 Thousand/uL      RBC 4.97 Million/uL      Hemoglobin 14.5 g/dL      Hematocrit 44.3 %      MCV 89 fL      MCH 29.2 pg      MCHC 32.7 g/dL      RDW 12.0 %      MPV 8.9 fL      Platelets 354  Thousands/uL      nRBC 0 /100 WBCs      Segmented % 70 %      Immature Grans % 0 %      Lymphocytes % 22 %      Monocytes % 8 %      Eosinophils Relative 0 %      Basophils Relative 0 %      Absolute Neutrophils 5.12 Thousands/µL      Absolute Immature Grans 0.02 Thousand/uL      Absolute Lymphocytes 1.61 Thousands/µL      Absolute Monocytes 0.57 Thousand/µL      Eosinophils Absolute 0.03 Thousand/µL      Basophils Absolute 0.02 Thousands/µL     Blood culture #1 [[id number]] Collected: 10/03/24 1851    Lab Status: In process Specimen: Blood from Hand, Left Updated: 10/03/24 1858    Blood culture #2 [[id number]] Collected: 10/03/24 1848    Lab Status: In process Specimen: Blood from Arm, Left Updated: 10/03/24 1858            No orders to display       Procedures    ED Medication and Procedure Management   Prior to Admission Medications   Prescriptions Last Dose Informant Patient Reported? Taking?   Norethin Ace-Eth Estrad-FE (BLISOVI 24 FE PO)   Yes No   Sig: Take by mouth   amoxicillin-clavulanate (AUGMENTIN) 875-125 mg per tablet   No No   Sig: Take 1 tablet by mouth every 12 (twelve) hours for 7 days   ondansetron (ZOFRAN) 4 mg tablet   No No   Sig: Take 1 tablet (4 mg total) by mouth every 6 (six) hours      Facility-Administered Medications: None     Current Discharge Medication List        CONTINUE these medications which have NOT CHANGED    Details   amoxicillin-clavulanate (AUGMENTIN) 875-125 mg per tablet Take 1 tablet by mouth every 12 (twelve) hours for 7 days  Qty: 14 tablet, Refills: 0    Associated Diagnoses: Cat bite, initial encounter; Need for rabies vaccination      Norethin Ace-Eth Estrad-FE (BLISOVI 24 FE PO) Take by mouth      ondansetron (ZOFRAN) 4 mg tablet Take 1 tablet (4 mg total) by mouth every 6 (six) hours  Qty: 12 tablet, Refills: 0    Associated Diagnoses: Cat bite, initial encounter           No discharge procedures on file.  ED SEPSIS DOCUMENTATION   Time reflects when diagnosis was  documented in both MDM as applicable and the Disposition within this note       Time User Action Codes Description Comment    10/3/2024  7:42 PM Lynne Varela Add [L03.90] Cellulitis                  Lynne Varela,   10/03/24 2201       Lynne Varela,   10/03/24 2202

## 2024-10-04 ENCOUNTER — APPOINTMENT (OUTPATIENT)
Dept: CT IMAGING | Facility: HOSPITAL | Age: 34
End: 2024-10-04
Payer: COMMERCIAL

## 2024-10-04 VITALS
HEIGHT: 66 IN | OXYGEN SATURATION: 98 % | TEMPERATURE: 98.1 F | HEART RATE: 74 BPM | WEIGHT: 167.33 LBS | BODY MASS INDEX: 26.89 KG/M2 | SYSTOLIC BLOOD PRESSURE: 118 MMHG | DIASTOLIC BLOOD PRESSURE: 73 MMHG | RESPIRATION RATE: 17 BRPM

## 2024-10-04 PROCEDURE — 99238 HOSP IP/OBS DSCHRG MGMT 30/<: CPT | Performed by: STUDENT IN AN ORGANIZED HEALTH CARE EDUCATION/TRAINING PROGRAM

## 2024-10-04 PROCEDURE — 73201 CT UPPER EXTREMITY W/DYE: CPT

## 2024-10-04 RX ORDER — FLUCONAZOLE 150 MG/1
150 TABLET ORAL ONCE AS NEEDED
Qty: 1 TABLET | Refills: 0 | Status: SHIPPED | OUTPATIENT
Start: 2024-10-04 | End: 2024-10-16

## 2024-10-04 RX ORDER — DOXYCYCLINE 100 MG/1
100 CAPSULE ORAL EVERY 12 HOURS SCHEDULED
Qty: 20 CAPSULE | Refills: 0 | Status: SHIPPED | OUTPATIENT
Start: 2024-10-04 | End: 2024-10-14

## 2024-10-04 RX ORDER — METRONIDAZOLE 500 MG/1
500 TABLET ORAL EVERY 8 HOURS SCHEDULED
Qty: 30 TABLET | Refills: 0 | Status: SHIPPED | OUTPATIENT
Start: 2024-10-04 | End: 2024-10-14

## 2024-10-04 RX ORDER — CEFUROXIME AXETIL 250 MG/1
500 TABLET ORAL EVERY 12 HOURS SCHEDULED
Status: DISCONTINUED | OUTPATIENT
Start: 2024-10-04 | End: 2024-10-04

## 2024-10-04 RX ORDER — METRONIDAZOLE 500 MG/1
500 TABLET ORAL EVERY 8 HOURS SCHEDULED
Status: DISCONTINUED | OUTPATIENT
Start: 2024-10-04 | End: 2024-10-04 | Stop reason: HOSPADM

## 2024-10-04 RX ADMIN — IOHEXOL 100 ML: 350 INJECTION, SOLUTION INTRAVENOUS at 09:39

## 2024-10-04 RX ADMIN — ENOXAPARIN SODIUM 40 MG: 40 INJECTION SUBCUTANEOUS at 09:17

## 2024-10-04 RX ADMIN — METRONIDAZOLE 500 MG: 500 TABLET ORAL at 12:07

## 2024-10-04 RX ADMIN — METRONIDAZOLE 500 MG: 500 TABLET ORAL at 05:50

## 2024-10-04 RX ADMIN — CEFTRIAXONE SODIUM 1000 MG: 10 INJECTION, POWDER, FOR SOLUTION INTRAVENOUS at 05:50

## 2024-10-04 NOTE — DISCHARGE INSTR - AVS FIRST PAGE
Dear Melvi Morrison,     [facility]  It is our hope that we were always able to exceed the expected standards for your care during your stay.  You were hospitalized due to cat bite infection.  You were cared for on the 2ns floor by Jona Lebron DO under the service of Carlos Jarrett MD with the [facility] who covers for your primary care physician (PCP), No primary care provider on file., while you were hospitalized.  If you have any questions or concerns related to this hospitalization, you may contact us at .  For follow up as well as any medication refills, we recommend that you follow up with your primary care physician.  A registered nurse will reach out to you by phone within a few days after your discharge to answer any additional questions that you may have after going home.  However, at this time we provide for you here, the most important instructions / recommendations at discharge:     Notable Medication Adjustments -   START doxycycline 100mg twice daily - take on an empty stomach without eating 2 hours before and after. This medicine may make your skin more sensitive to sunlight.  START flagyl/metronidazole every 8 hours   Important follow up information -   Please see your PCP within one week  Please return to the ED tomorrow 10/5 for your rabies vaccine  Other Instructions -   Please return to the ED with any worsening symptoms, fevers, chills, hives, or allergic reactions  Please review this entire after visit summary as additional general instructions including medication list, appointments, activity, diet, any pertinent wound care, and other additional recommendations from your care team that may be provided for you.      Sincerely,     Jona Lebron DO

## 2024-10-04 NOTE — H&P
H&P - Hospitalist   Name: Melvi Morrison 34 y.o. female I MRN: [id number]  Unit/Bed#: -01 I Date of Admission: 10/3/2024   Date of Service: 10/3/2024 I Hospital Day: 0     Assessment & Plan  Cellulitis of right upper extremity  Came to the ED yesterday and took 2 dose of oral Augmentin and reported worsening pain and redness  X-ray right forearm negative  Patient did receive 1 dose of IV Unasyn in the ED and was reporting after the dose pruritus on face and neck, no rash noted at this time, 1 hive noted on left side of face  Will switch from IV Unasyn to IV ceftriaxone and Flagyl and monitor for any reaction as patient with cefaclor allergy noted in chart, but reports she is unsure which antibiotic she did have a reaction to when she was younger      VTE Pharmacologic Prophylaxis: VTE Score: 3 Moderate Risk (Score 3-4) - Pharmacological DVT Prophylaxis Ordered: enoxaparin (Lovenox).  Code Status: Level 1 - Full Code   Discussion with family: Updated  (mother) at bedside.    Anticipated Length of Stay: Patient will be admitted on an observation basis with an anticipated length of stay of less than 2 midnights secondary to see above.    History of Present Illness   Chief Complaint:    Chief Complaint   Patient presents with    Wound Check     Pt arrives c/o drainage and redness to previous cat bite. Pt seen in ED and prescribed antibiotics. Denies fevers        Melvi Morrison is a 34 y.o. female with no significant PMH who presents with complaints of sudden onset swelling, erythema, pain to right forearm after a cat bite.  She reports it is a feral cat so she does not know vaccination status of the cat.  She did come to the ED yesterday and was prescribed Augmentin.  She reports she had 2 doses of Augmentin and then noticed she did have worsening pain at the site of the bite and also the redness was starting to spread more so she came back to the ED.  Currently reports after receiving pain meds in the ED  the pain has subsided.  No fevers, chills, pain radiating up right arm.  Denies currently being pregnant or breast-feeding    Review of Systems   Constitutional:  Negative for chills and fever.   Respiratory:  Negative for shortness of breath.    Cardiovascular:  Negative for chest pain.   Gastrointestinal:  Negative for abdominal pain.   Musculoskeletal:  Positive for myalgias.   Skin:  Positive for color change and wound.       Historical Information   History reviewed. No pertinent past medical history.  Past Surgical History:   Procedure Laterality Date    RETINAL DETACHMENT REPAIR W/ SCLERAL BUCKLE LE Right 2020    TONSILLECTOMY       Social History     Tobacco Use    Smoking status: Never    Smokeless tobacco: Never   Vaping Use    Vaping status: Never Used   Substance and Sexual Activity    Alcohol use: Never    Drug use: Never    Sexual activity: Not on file     E-Cigarette/Vaping    E-Cigarette Use Never User      E-Cigarette/Vaping Substances    Nicotine No     THC No     CBD No     Flavoring No     Other No     Unknown No      Family history non-contributory  Social History:  Marital Status: Single     Patient Pre-hospital Living Situation: Home  Patient Pre-hospital Level of Mobility: walks  Patient Pre-hospital Diet Restrictions: n/a    Objective :  Temp:  [98 °F (36.7 °C)-98.6 °F (37 °C)] 98.6 °F (37 °C)  HR:  [74-98] 74  BP: (111-130)/(65-85) 111/73  Resp:  [18-20] 20  SpO2:  [97 %-99 %] 98 %  O2 Device: None (Room air)    Physical Exam  Vitals and nursing note reviewed.   Constitutional:       General: She is not in acute distress.     Appearance: Normal appearance. She is not diaphoretic.   HENT:      Head: Normocephalic.   Cardiovascular:      Rate and Rhythm: Normal rate and regular rhythm.   Pulmonary:      Effort: Pulmonary effort is normal. No respiratory distress.      Breath sounds: Normal breath sounds.   Abdominal:      General: There is no distension.   Musculoskeletal:      Right lower  leg: No edema.      Left lower leg: No edema.      Comments: Edema noted in distal right forearm with puncture wounds noted from cat bite.  No tenderness to palpation   Skin:     General: Skin is warm and dry.      Findings: Erythema present. No bruising.   Neurological:      General: No focal deficit present.      Mental Status: She is alert and oriented to person, place, and time.   Psychiatric:         Mood and Affect: Mood normal.         Behavior: Behavior normal.         Thought Content: Thought content normal.         Lines/Drains:            Lab Results: I have reviewed the following results:  Results from last 7 days   Lab Units 10/03/24  1848   WBC Thousand/uL 7.37   HEMOGLOBIN g/dL 14.5   HEMATOCRIT % 44.3   PLATELETS Thousands/uL 354   SEGS PCT % 70   LYMPHO PCT % 22   MONO PCT % 8   EOS PCT % 0     Results from last 7 days   Lab Units 10/03/24  1848   SODIUM mmol/L 138   POTASSIUM mmol/L 4.0   CHLORIDE mmol/L 104   CO2 mmol/L 24   BUN mg/dL 11   CREATININE mg/dL 0.76   ANION GAP mmol/L 10   CALCIUM mg/dL 9.4   ALBUMIN g/dL 4.6   TOTAL BILIRUBIN mg/dL 0.60   ALK PHOS U/L 43   ALT U/L 10   AST U/L 12*   GLUCOSE RANDOM mg/dL 85             Lab Results   Component Value Date    HGBA1C 5.0 07/18/2024    HGBA1C 4.8 02/11/2022     Results from last 7 days   Lab Units 10/03/24  1848   LACTIC ACID mmol/L 0.8   PROCALCITONIN ng/ml <0.05       Imaging Results Review: I reviewed radiology reports from this admission including: xray(s).  Other Study Results Review: No additional pertinent studies reviewed.    Administrative Statements   I have spent a total time of 61 minutes in caring for this patient on the day of the visit/encounter including Diagnostic results, Prognosis, Patient and family education, Counseling / Coordination of care, Documenting in the medical record, Reviewing / ordering tests, medicine, procedures  , and Obtaining or reviewing history  .    ** Please Note: This note has been constructed  using a voice recognition system. **

## 2024-10-04 NOTE — ASSESSMENT & PLAN NOTE
Came to the ED yesterday and took 2 dose of oral Augmentin and reported worsening pain and redness  CT arm without abscess  Had hives with unasyn.   Tolerated ceftriaxone.    Discussed trialing ceftin or other cephalosporin while here including risk of allergic reaction (hives, anaphylaxis) vs using doxycycline. Patient wants to use doxycycline.    Discharge on doxycycline/flagyl for 10 day course. Return to ED tomorrow for rabies vaccine.

## 2024-10-04 NOTE — DISCHARGE SUMMARY
"Discharge Summary - Hospitalist   Name: Melvi Morrison 34 y.o. female I MRN: [id number]  Unit/Bed#: -01 I Date of Admission: 10/3/2024   Date of Service: 10/4/2024 I Hospital Day: 0     Assessment & Plan  Cellulitis of right upper extremity  Came to the ED yesterday and took 2 dose of oral Augmentin and reported worsening pain and redness  CT arm without abscess  Had hives with unasyn.   Tolerated ceftriaxone.    Discussed trialing ceftin or other cephalosporin while here including risk of allergic reaction (hives, anaphylaxis) vs using doxycycline. Patient wants to use doxycycline.    Discharge on doxycycline/flagyl for 10 day course. Return to ED tomorrow for rabies vaccine.     Medical Problems       Resolved Problems  Date Reviewed: 10/3/2024   None       Discharging Physician / Practitioner: Jona Lebron DO  PCP: No primary care provider on file.  Admission Date:   Admission Orders (From admission, onward)       Ordered        10/03/24 2014  Place in Observation  Once                          Discharge Date: 10/04/24        Complications:  none    Reason for Admission: Cat bite infection    Hospital Course:   Melvi Morrison is a 34 y.o. female patient who originally presented to the hospital on 10/3/2024 due to infection from a maribell bite. She was given unasyn in the ED but developed hives. Did not meet SIRS criteria. She tolerated ceftriaxone. CT showed no evidence of fluid collection. She was discharged home on doxycycline and flagyl.           Condition at Discharge: good    Discharge Day Visit / Exam:   Subjective:  feeling well, feels like her arm swelling is going down.  Vitals: Blood Pressure: 118/73 (10/04/24 0818)  Pulse: 74 (10/03/24 2100)  Temperature: 98.1 °F (36.7 °C) (10/04/24 0818)  Temp Source: Oral (10/03/24 2100)  Respirations: 17 (10/04/24 0818)  Height: 5' 6\" (167.6 cm) (10/03/24 2247)  Weight - Scale: 75.9 kg (167 lb 5.3 oz) (10/03/24 2247)  SpO2: 98 % (10/03/24 2013)  Physical " Exam  Constitutional:       General: She is not in acute distress.     Appearance: She is not ill-appearing.   Cardiovascular:      Rate and Rhythm: Normal rate and regular rhythm.      Heart sounds: No murmur heard.  Pulmonary:      Effort: Pulmonary effort is normal. No respiratory distress.      Breath sounds: Normal breath sounds. No wheezing.   Skin:     General: Skin is warm.      Findings: Erythema (right forearm with associated warmth) present.      Comments: Small puncture wounds on right forearm. No fluctuance or drainage.    Neurological:      Mental Status: She is alert.          Discussion with Family: Patient declined call to .     Discharge instructions/Information to patient and family:   See after visit summary for information provided to patient and family.      Provisions for Follow-Up Care:  See after visit summary for information related to follow-up care and any pertinent home health orders.      Mobility at time of Discharge:   Basic Mobility Inpatient Raw Score: 24  JH-HLM Goal: 8: Walk 250 feet or more  JH-HLM Achieved: 6: Walk 10 steps or more  HLM Goal achieved. Continue to encourage appropriate mobility.     Disposition:   Home    Planned Readmission: no    Discharge Medications:  See after visit summary for reconciled discharge medications provided to patient and/or family.      Administrative Statements     **Please Note: This note may have been constructed using a voice recognition system**

## 2024-10-04 NOTE — ASSESSMENT & PLAN NOTE
Came to the ED yesterday and took 2 dose of oral Augmentin and reported worsening pain and redness  X-ray right forearm negative  Patient did receive 1 dose of IV Unasyn in the ED and was reporting after the dose pruritus on face and neck, no rash noted at this time, 1 hive noted on left side of face  Will switch from IV Unasyn to IV ceftriaxone and Flagyl and monitor for any reaction as patient with cefaclor allergy noted in chart, but reports she is unsure which antibiotic she did have a reaction to when she was younger

## 2024-10-04 NOTE — PLAN OF CARE
Problem: PAIN - ADULT  Goal: Verbalizes/displays adequate comfort level or baseline comfort level  Description: Interventions:  - Encourage patient to monitor pain and request assistance  - Assess pain using appropriate pain scale  - Administer analgesics based on type and severity of pain and evaluate response  - Implement non-pharmacological measures as appropriate and evaluate response  - Consider cultural and social influences on pain and pain management  - Notify physician/advanced practitioner if interventions unsuccessful or patient reports new pain  Outcome: Progressing     Problem: INFECTION - ADULT  Goal: Absence or prevention of progression during hospitalization  Description: INTERVENTIONS:  - Assess and monitor for signs and symptoms of infection  - Monitor lab/diagnostic results  - Monitor all insertion sites, i.e. indwelling lines, tubes, and drains  - Monitor endotracheal if appropriate and nasal secretions for changes in amount and color  - [place] appropriate cooling/warming therapies per order  - Administer medications as ordered  - Instruct and encourage patient and family to use good hand hygiene technique  - Identify and instruct in appropriate isolation precautions for identified infection/condition  Outcome: Progressing  Goal: Absence of fever/infection during neutropenic period  Description: INTERVENTIONS:  - Monitor WBC    Outcome: Progressing     Problem: DISCHARGE PLANNING  Goal: Discharge to home or other facility with appropriate resources  Description: INTERVENTIONS:  - Identify barriers to discharge w/patient and caregiver  - Arrange for needed discharge resources and transportation as appropriate  - Identify discharge learning needs (meds, wound care, etc.)  - Arrange for interpretive services to assist at discharge as needed  - Refer to Case Management Department for coordinating discharge planning if the patient needs post-hospital services based on physician/advanced  practitioner order or complex needs related to functional status, cognitive ability, or social support system  Outcome: Progressing     Problem: Knowledge Deficit  Goal: Patient/family/caregiver demonstrates understanding of disease process, treatment plan, medications, and discharge instructions  Description: Complete learning assessment and assess knowledge base.  Interventions:  - Provide teaching at level of understanding  - Provide teaching via preferred learning methods  Outcome: Progressing     Problem: NEUROSENSORY - ADULT  Goal: Achieves stable or improved neurological status  Description: INTERVENTIONS  - Monitor and report changes in neurological status  - Monitor vital signs such as temperature, blood pressure, glucose, and any other labs ordered   - Initiate measures to prevent increased intracranial pressure  - Monitor for seizure activity and implement precautions if appropriate      Outcome: Progressing  Goal: Remains free of injury related to seizures activity  Description: INTERVENTIONS  - Maintain airway, patient safety  and administer oxygen as ordered  - Monitor patient for seizure activity, document and report duration and description of seizure to physician/advanced practitioner  - If seizure occurs,  ensure patient safety during seizure  - Reorient patient post seizure  - Seizure pads on all 4 side rails  - Instruct patient/family to notify RN of any seizure activity including if an aura is experienced  - Instruct patient/family to call for assistance with activity based on nursing assessment  - Administer anti-seizure medications if ordered    Outcome: Progressing     Problem: RESPIRATORY - ADULT  Goal: Achieves optimal ventilation and oxygenation  Description: INTERVENTIONS:  - Assess for changes in respiratory status  - Assess for changes in mentation and behavior  - Position to facilitate oxygenation and minimize respiratory effort  - Oxygen administered by appropriate delivery if  ordered  - Initiate smoking cessation education as indicated  - Encourage broncho-pulmonary hygiene including cough, deep breathe, Incentive Spirometry  - Assess the need for suctioning and aspirate as needed  - Assess and instruct to report SOB or any respiratory difficulty  - Respiratory Therapy support as indicated  Outcome: Progressing     Problem: SKIN/TISSUE INTEGRITY - ADULT  Goal: Skin Integrity remains intact(Skin Breakdown Prevention)  Description: Assess:  -Perform Thanh assessment   -Clean and moisturize skin   -Inspect skin when repositioning, toileting, and assisting with ADLS  -Assess under medical devices   -Assess extremities for adequate circulation and sensation     Bed Management:  -Have minimal linens on bed & keep smooth, unwrinkled  -Change linens as needed when moist or perspiring  -Avoid sitting or lying in one position for more than 2 hours while in bed  -Keep HOB at 45 degrees     Activity:  -Mobilize patient 3 times a day  -Encourage activity and walks on unit  -Encourage or provide ROM exercises   -Turn and reposition patient every 2 Hours  -Use appropriate equipment to lift or move patient in bed  -Instruct/ Assist with weight shifting every 3 when out of bed in chair  -Consider limitation of chair time 2 hour intervals    Skin Care:  -Avoid use of baby powder, tape, friction and shearing, hot water or constrictive clothing  -Relieve pressure over bony prominences using wedge  -Do not massage red bony areas    Next Steps:  -Teach patient strategies to minimize risks   -Consider consults to  interdisciplinary teams   Outcome: Progressing  Goal: Incision(s), wounds(s) or drain site(s) healing without S/S of infection  Description: INTERVENTIONS  - Assess and document dressing, incision, wound bed, drain sites and surrounding tissue  - Provide patient and family education  - Perform skin care/dressing changes every 8 hours  Outcome: Progressing  Goal: Pressure injury heals and does not  worsen  Description: Interventions:  - Implement low air loss mattress or specialty surface (Criteria met)  - Apply silicone foam dressing  - Instruct/assist with weight shifting every 2 minutes when in chair   - Limit chair time to 2 hour intervals  - Use special pressure reducing interventions such as cushion when in chair   - Apply fecal or urinary incontinence containment device   - Perform passive or active ROM every 2  - Turn and reposition patient & offload bony prominences every 2 hours   - Utilize friction reducing device or surface for transfers   - Consider consults to  interdisciplinary teams   - Use incontinent care products after each incontinent episode   - Consider nutrition services referral as needed  Outcome: Progressing

## 2024-10-04 NOTE — PLAN OF CARE
Problem: PAIN - ADULT  Goal: Verbalizes/displays adequate comfort level or baseline comfort level  Description: Interventions:  - Encourage patient to monitor pain and request assistance  - Assess pain using appropriate pain scale  - Administer analgesics based on type and severity of pain and evaluate response  - Implement non-pharmacological measures as appropriate and evaluate response  - Consider cultural and social influences on pain and pain management  - Notify physician/advanced practitioner if interventions unsuccessful or patient reports new pain  Outcome: Progressing     Problem: INFECTION - ADULT  Goal: Absence or prevention of progression during hospitalization  Description: INTERVENTIONS:  - Assess and monitor for signs and symptoms of infection  - Monitor lab/diagnostic results  - Monitor all insertion sites, i.e. indwelling lines, tubes, and drains  - Monitor endotracheal if appropriate and nasal secretions for changes in amount and color  - [place] appropriate cooling/warming therapies per order  - Administer medications as ordered  - Instruct and encourage patient and family to use good hand hygiene technique  - Identify and instruct in appropriate isolation precautions for identified infection/condition  Outcome: Progressing  Goal: Absence of fever/infection during neutropenic period  Description: INTERVENTIONS:  - Monitor WBC    Outcome: Progressing     Problem: DISCHARGE PLANNING  Goal: Discharge to home or other facility with appropriate resources  Description: INTERVENTIONS:  - Identify barriers to discharge w/patient and caregiver  - Arrange for needed discharge resources and transportation as appropriate  - Identify discharge learning needs (meds, wound care, etc.)  - Arrange for interpretive services to assist at discharge as needed  - Refer to Case Management Department for coordinating discharge planning if the patient needs post-hospital services based on physician/advanced  practitioner order or complex needs related to functional status, cognitive ability, or social support system  Outcome: Progressing     Problem: Knowledge Deficit  Goal: Patient/family/caregiver demonstrates understanding of disease process, treatment plan, medications, and discharge instructions  Description: Complete learning assessment and assess knowledge base.  Interventions:  - Provide teaching at level of understanding  - Provide teaching via preferred learning methods  Outcome: Progressing     Problem: NEUROSENSORY - ADULT  Goal: Achieves stable or improved neurological status  Description: INTERVENTIONS  - Monitor and report changes in neurological status  - Monitor vital signs such as temperature, blood pressure, glucose, and any other labs ordered   - Initiate measures to prevent increased intracranial pressure  - Monitor for seizure activity and implement precautions if appropriate      Outcome: Progressing  Goal: Remains free of injury related to seizures activity  Description: INTERVENTIONS  - Maintain airway, patient safety  and administer oxygen as ordered  - Monitor patient for seizure activity, document and report duration and description of seizure to physician/advanced practitioner  - If seizure occurs,  ensure patient safety during seizure  - Reorient patient post seizure  - Seizure pads on all 4 side rails  - Instruct patient/family to notify RN of any seizure activity including if an aura is experienced  - Instruct patient/family to call for assistance with activity based on nursing assessment  - Administer anti-seizure medications if ordered    Outcome: Progressing     Problem: RESPIRATORY - ADULT  Goal: Achieves optimal ventilation and oxygenation  Description: INTERVENTIONS:  - Assess for changes in respiratory status  - Assess for changes in mentation and behavior  - Position to facilitate oxygenation and minimize respiratory effort  - Oxygen administered by appropriate delivery if  ordered  - Initiate smoking cessation education as indicated  - Encourage broncho-pulmonary hygiene including cough, deep breathe, Incentive Spirometry  - Assess the need for suctioning and aspirate as needed  - Assess and instruct to report SOB or any respiratory difficulty  - Respiratory Therapy support as indicated  Outcome: Progressing     Problem: SKIN/TISSUE INTEGRITY - ADULT  Goal: Skin Integrity remains intact(Skin Breakdown Prevention)  Description: Assess:  -Assess extremities for adequate circulation and sensation     Bed Management:  -Have minimal linens on bed & keep smooth, unwrinkled  -Change linens as needed when moist or perspiring  -  - Consider nutrition services referral as needed  Outcome: Progressing

## 2024-10-05 ENCOUNTER — HOSPITAL ENCOUNTER (EMERGENCY)
Facility: HOSPITAL | Age: 34
Discharge: HOME | End: 2024-10-05
Attending: EMERGENCY MEDICINE | Admitting: EMERGENCY MEDICINE
Payer: COMMERCIAL

## 2024-10-05 VITALS
DIASTOLIC BLOOD PRESSURE: 79 MMHG | TEMPERATURE: 97.5 F | RESPIRATION RATE: 18 BRPM | OXYGEN SATURATION: 97 % | HEART RATE: 95 BPM | SYSTOLIC BLOOD PRESSURE: 116 MMHG

## 2024-10-05 DIAGNOSIS — Z23 ENCOUNTER FOR REPEAT ADMINISTRATION OF RABIES VACCINATION: Primary | ICD-10-CM

## 2024-10-05 PROCEDURE — 90471 IMMUNIZATION ADMIN: CPT

## 2024-10-05 PROCEDURE — 90675 RABIES VACCINE IM: CPT | Performed by: NURSE PRACTITIONER

## 2024-10-05 PROCEDURE — 99283 EMERGENCY DEPT VISIT LOW MDM: CPT | Performed by: NURSE PRACTITIONER

## 2024-10-05 RX ADMIN — RABIES VIRUS STRAIN PM-1503-3M ANTIGEN (PROPIOLACTONE INACTIVATED) AND WATER 1 ML: KIT at 08:55

## 2024-10-05 NOTE — ED PROVIDER NOTES
Final diagnoses:   Encounter for repeat administration of rabies vaccination     ED Disposition       ED Disposition   Discharge    Condition   Stable    Date/Time   Sat Oct 5, 2024  9:02 AM    Comment   Melvi Morrison discharge to home/self care.                   Assessment & Plan       Medical Decision Making  After discussing postexposure prophylaxis and previously vaccinated patient the patient has opted to only undergo what the CDC recommends which is a series of 2 vaccines day 0 and day 3.  Today is her final vaccination.    Risk  Prescription drug management.             Medications   rabies vaccine, human diploid IM injection 1 mL (1 mL Intramuscular Given 10/5/24 0855)       ED Risk Strat Scores                           SBIRT 22yo+      Flowsheet Row Most Recent Value   Initial Alcohol Screen: US AUDIT-C     1. How often do you have a drink containing alcohol? 0 Filed at: 10/05/2024 0852   2. How many drinks containing alcohol do you have on a typical day you are drinking?  0 Filed at: 10/05/2024 0852   3b. FEMALE Any Age, or MALE 65+: How often do you have 4 or more drinks on one occassion? 0 Filed at: 10/05/2024 0852   Audit-C Score 0 Filed at: 10/05/2024 0852   CLARA: How many times in the past year have you...    Used an illegal drug or used a prescription medication for non-medical reasons? Never Filed at: 10/05/2024 0852                            History of Present Illness       Chief Complaint   Patient presents with    Follow Up Rabies     Pt present for follow up 2nd rabies shot. No reactions with previous rabies shots.       History reviewed. No pertinent past medical history.   Past Surgical History:   Procedure Laterality Date    RETINAL DETACHMENT REPAIR W/ SCLERAL BUCKLE LE Right 2020    TONSILLECTOMY        Family History   Problem Relation Age of Onset    Hodgkin's lymphoma Brother       Social History     Tobacco Use    Smoking status: Never    Smokeless tobacco: Never   Vaping Use    Vaping  status: Never Used   Substance Use Topics    Alcohol use: Never    Drug use: Never      E-Cigarette/Vaping    E-Cigarette Use Never User       E-Cigarette/Vaping Substances    Nicotine No     THC No     CBD No     Flavoring No     Other No     Unknown No       I have reviewed and agree with the history as documented.     Is a 34-year-old female that was previously vaccinated for rabies and 2022 through [facility].  She reports the initial immunoglobulin administration plus the administration of the series of 4 vaccines.  Was inquiring as to whether she needed the entire series again.  Based on the CDC's recommendation for a previously vaccinated patient that postexposure prophylaxis includes a series of 2 vaccinations.  Based on that information this is her final vaccination for this exposure.  Otherwise her right arm wound appears to be healing well no evidence of cellulitis at this point.          Review of Systems   Constitutional:  Negative for diaphoresis, fatigue and fever.   HENT:  Negative for congestion, ear pain, nosebleeds and sore throat.    Eyes:  Negative for photophobia, pain, discharge and visual disturbance.   Respiratory:  Negative for cough, choking, chest tightness, shortness of breath and wheezing.    Cardiovascular:  Negative for chest pain and palpitations.   Gastrointestinal:  Negative for abdominal distention, abdominal pain, diarrhea and vomiting.   Genitourinary:  Negative for dysuria, flank pain and frequency.   Musculoskeletal:  Negative for back pain, gait problem and joint swelling.   Skin:  Positive for wound. Negative for color change and rash.   Neurological:  Negative for dizziness, syncope and headaches.   Psychiatric/Behavioral:  Negative for behavioral problems and confusion. The patient is not nervous/anxious.    All other systems reviewed and are negative.          Objective       ED Triage Vitals [10/05/24 0851]   Temperature Pulse Blood Pressure Respirations SpO2 Patient  Position - Orthostatic VS   97.5 °F (36.4 °C) 95 116/79 18 97 % --      Temp Source Heart Rate Source BP Location FiO2 (%) Pain Score    Temporal Monitor -- -- --      Vitals      Date and Time Temp Pulse SpO2 Resp BP Pain Score FACES Pain Rating User   10/05/24 0851 97.5 °F (36.4 °C) 95 97 % 18 116/79 -- -- TF            Physical Exam  Vitals and nursing note reviewed.   Constitutional:       General: She is not in acute distress.     Appearance: She is well-developed. She is not ill-appearing or toxic-appearing.   HENT:      Head: Normocephalic and atraumatic.      Nose: No rhinorrhea.      Mouth/Throat:      Mouth: Mucous membranes are moist.      Dentition: Normal dentition.   Eyes:      General:         Right eye: No discharge.         Left eye: No discharge.   Cardiovascular:      Rate and Rhythm: Normal rate and regular rhythm.   Pulmonary:      Effort: Pulmonary effort is normal. No accessory muscle usage or respiratory distress.   Abdominal:      General: There is no distension.      Tenderness: There is no guarding.   Musculoskeletal:         General: Normal range of motion.      Cervical back: Normal range of motion and neck supple. No rigidity.   Skin:     General: Skin is warm and dry.      Comments: Well-healing wound over the right forearm   Neurological:      Mental Status: She is alert and oriented to person, place, and time.      Coordination: Coordination normal.   Psychiatric:         Behavior: Behavior is cooperative.         Results Reviewed       None            No orders to display       Procedures    ED Medication and Procedure Management   Prior to Admission Medications   Prescriptions Last Dose Informant Patient Reported? Taking?   Norethin Ace-Eth Estrad-FE (BLISOVI 24 FE PO)   Yes No   Sig: Take by mouth   doxycycline hyclate (VIBRAMYCIN) 100 mg capsule   No No   Sig: Take 1 capsule (100 mg total) by mouth every 12 (twelve) hours for 10 days   fluconazole (DIFLUCAN) 150 mg tablet   No No    Sig: Take 1 tablet (150 mg total) by mouth once as needed (yeast infection) for up to 1 dose   metroNIDAZOLE (FLAGYL) 500 mg tablet   No No   Sig: Take 1 tablet (500 mg total) by mouth every 8 (eight) hours for 10 days   ondansetron (ZOFRAN) 4 mg tablet   No No   Sig: Take 1 tablet (4 mg total) by mouth every 6 (six) hours      Facility-Administered Medications: None     Patient's Medications   Discharge Prescriptions    No medications on file     No discharge procedures on file.  ED SEPSIS DOCUMENTATION   Time reflects when diagnosis was documented in both MDM as applicable and the Disposition within this note       Time User Action Codes Description Comment    10/5/2024  9:02 AM Teddy Chilel Add [Z23] Encounter for repeat administration of rabies vaccination                  DREW Chen  10/05/24 0902

## 2024-10-06 LAB
BACTERIA BLD CULT: NORMAL
BACTERIA BLD CULT: NORMAL

## 2024-10-08 LAB
BACTERIA BLD CULT: NORMAL
BACTERIA BLD CULT: NORMAL

## 2024-10-09 ENCOUNTER — HOSPITAL ENCOUNTER (EMERGENCY)
Facility: HOSPITAL | Age: 34
Discharge: HOME | End: 2024-10-09
Attending: EMERGENCY MEDICINE
Payer: COMMERCIAL

## 2024-10-09 VITALS
SYSTOLIC BLOOD PRESSURE: 136 MMHG | OXYGEN SATURATION: 98 % | RESPIRATION RATE: 18 BRPM | TEMPERATURE: 97.7 F | DIASTOLIC BLOOD PRESSURE: 80 MMHG | HEART RATE: 96 BPM

## 2024-10-09 DIAGNOSIS — Z23 ENCOUNTER FOR REPEAT ADMINISTRATION OF RABIES VACCINATION: Primary | ICD-10-CM

## 2024-10-09 PROCEDURE — 90675 RABIES VACCINE IM: CPT | Performed by: EMERGENCY MEDICINE

## 2024-10-09 PROCEDURE — 99284 EMERGENCY DEPT VISIT MOD MDM: CPT | Performed by: EMERGENCY MEDICINE

## 2024-10-09 PROCEDURE — 90471 IMMUNIZATION ADMIN: CPT

## 2024-10-09 RX ADMIN — RABIES VIRUS STRAIN PM-1503-3M ANTIGEN (PROPIOLACTONE INACTIVATED) AND WATER 1 ML: KIT at 18:37

## 2024-10-09 NOTE — DISCHARGE INSTRUCTIONS
A  personal message from Dr. Teddy Yan,  Thank you so much for allowing me to care for you today.    I pride myself in the care and attention I give all my patients.  I hope you were a witness to this tonight.   If for any reason your condition does not improve or worsens, or you have a question that was not answered during your visit you can feel free to text me on my personal phone #  # [phone].   I will answer to your message and continue your care past your emergency room visit.     Please understand that although you are being discharged because your condition has been deemed stable and able to be managed on an outpatient setting. However your condition may worsen as part of the natural progression of the illness/condition, if this occurs please come back to the emergency department for a repeat evaluation.

## 2024-10-13 NOTE — ED PROVIDER NOTES
Time reflects when diagnosis was documented in both MDM as applicable and the Disposition within this note       Time User Action Codes Description Comment    10/9/2024  6:23 PM Teddy Yan Add [Z23] Encounter for repeat administration of rabies vaccination           ED Disposition       ED Disposition   Discharge    Condition   Stable    Date/Time   Wed Oct 9, 2024  6:23 PM    Comment   Melvi Morrison discharge to home/self care.                   Assessment & Plan       Medical Decision Making  Problems Addressed:  Encounter for repeat administration of rabies vaccination: acute illness or injury    Risk  OTC drugs.  Prescription drug management.             Medications   rabies vaccine, human diploid IM injection 1 mL (1 mL Intramuscular Given 10/9/24 1837)       ED Risk Strat Scores                                               History of Present Illness       Chief Complaint   Patient presents with    Follow Up Rabies     Arrives for 3rd rabies shot, has questions for doctor        No past medical history on file.   Past Surgical History:   Procedure Laterality Date    RETINAL DETACHMENT REPAIR W/ SCLERAL BUCKLE LE Right 2020    TONSILLECTOMY        Family History   Problem Relation Age of Onset    Hodgkin's lymphoma Brother       Social History     Tobacco Use    Smoking status: Never    Smokeless tobacco: Never   Vaping Use    Vaping status: Never Used   Substance Use Topics    Alcohol use: Never    Drug use: Never      E-Cigarette/Vaping    E-Cigarette Use Never User       E-Cigarette/Vaping Substances    Nicotine No     THC No     CBD No     Flavoring No     Other No     Unknown No       I have reviewed and agree with the history as documented.     Melvi Morrison is a 34 y.o.  year old female  No past medical history on file.  Social History    Tobacco Use      Smoking status: Never      Smokeless tobacco: Never    Vaping Use      Vaping status: Never Used    Alcohol use: Never    Drug use: Never    Patient  presents with:  Follow Up Rabies: Arrives for 3rd rabies shot, has questions for doctor   Patient has had rabies vaccines series but this was 2-3 years ago  No reaction to vaccines  ? From patient was wether or not to continue vaccination series since she has had it once before     History obtained directly from the PATIENT                  Review of Systems   Constitutional:  Negative for chills and fever.   HENT:  Negative for ear pain and sore throat.    Eyes:  Negative for pain and visual disturbance.   Respiratory:  Negative for cough and shortness of breath.    Cardiovascular:  Negative for chest pain and palpitations.   Gastrointestinal:  Negative for abdominal pain and vomiting.   Genitourinary:  Negative for dysuria and hematuria.   Musculoskeletal:  Negative for arthralgias and back pain.   Skin:  Negative for color change and rash.   Neurological:  Negative for seizures and syncope.   All other systems reviewed and are negative.          Objective       ED Triage Vitals [10/09/24 1805]   Temperature Pulse Blood Pressure Respirations SpO2 Patient Position - Orthostatic VS   97.7 °F (36.5 °C) 96 136/80 18 98 % Sitting      Temp Source Heart Rate Source BP Location FiO2 (%) Pain Score    Oral Monitor Left arm -- --      Vitals      Date and Time Temp Pulse SpO2 Resp BP Pain Score FACES Pain Rating User   10/09/24 1805 97.7 °F (36.5 °C) 96 98 % 18 136/80 -- -- AM            Physical Exam  Vitals reviewed.   Constitutional:       Appearance: Normal appearance.   HENT:      Head: Normocephalic.      Right Ear: External ear normal.      Left Ear: External ear normal.      Mouth/Throat:      Mouth: Mucous membranes are moist.   Eyes:      Pupils: Pupils are equal, round, and reactive to light.   Cardiovascular:      Rate and Rhythm: Normal rate.   Pulmonary:      Effort: Pulmonary effort is normal.   Skin:     Capillary Refill: Capillary refill takes less than 2 seconds.   Neurological:      General: No focal  deficit present.      Mental Status: She is alert and oriented to person, place, and time.   Psychiatric:         Mood and Affect: Mood normal.         Thought Content: Thought content normal.         Results Reviewed       None            No orders to display       Procedures    ED Medication and Procedure Management   Prior to Admission Medications   Prescriptions Last Dose Informant Patient Reported? Taking?   Norethin Ace-Eth Estrad-FE (BLISOVI 24 FE PO)   Yes No   Sig: Take by mouth   doxycycline hyclate (VIBRAMYCIN) 100 mg capsule   No No   Sig: Take 1 capsule (100 mg total) by mouth every 12 (twelve) hours for 10 days   fluconazole (DIFLUCAN) 150 mg tablet   No No   Sig: Take 1 tablet (150 mg total) by mouth once as needed (yeast infection) for up to 1 dose   metroNIDAZOLE (FLAGYL) 500 mg tablet   No No   Sig: Take 1 tablet (500 mg total) by mouth every 8 (eight) hours for 10 days   ondansetron (ZOFRAN) 4 mg tablet   No No   Sig: Take 1 tablet (4 mg total) by mouth every 6 (six) hours      Facility-Administered Medications: None     Discharge Medication List as of 10/9/2024  6:23 PM        CONTINUE these medications which have NOT CHANGED    Details   doxycycline hyclate (VIBRAMYCIN) 100 mg capsule Take 1 capsule (100 mg total) by mouth every 12 (twelve) hours for 10 days, Starting Fri 10/4/2024, Until Mon 10/14/2024, Normal      fluconazole (DIFLUCAN) 150 mg tablet Take 1 tablet (150 mg total) by mouth once as needed (yeast infection) for up to 1 dose, Starting Fri 10/4/2024, Until Wed 10/16/2024 at 2359, Normal      metroNIDAZOLE (FLAGYL) 500 mg tablet Take 1 tablet (500 mg total) by mouth every 8 (eight) hours for 10 days, Starting Fri 10/4/2024, Until Mon 10/14/2024, Normal      Norethin Ace-Eth Estrad-FE (BLISOVI 24 FE PO) Take by mouth, Historical Med      ondansetron (ZOFRAN) 4 mg tablet Take 1 tablet (4 mg total) by mouth every 6 (six) hours, Starting Wed 10/2/2024, Normal           No discharge  procedures on file.  ED SEPSIS DOCUMENTATION   Time reflects when diagnosis was documented in both MDM as applicable and the Disposition within this note       Time User Action Codes Description Comment    10/9/2024  6:23 PM Teddy Yan Add [Z23] Encounter for repeat administration of rabies vaccination                  Teddy Yan MD  10/13/24 0464

## 2024-10-16 ENCOUNTER — HOSPITAL ENCOUNTER (EMERGENCY)
Facility: HOSPITAL | Age: 34
Discharge: HOME | End: 2024-10-16
Attending: EMERGENCY MEDICINE
Payer: COMMERCIAL

## 2024-10-16 VITALS
TEMPERATURE: 98.4 F | SYSTOLIC BLOOD PRESSURE: 126 MMHG | DIASTOLIC BLOOD PRESSURE: 76 MMHG | RESPIRATION RATE: 18 BRPM | OXYGEN SATURATION: 97 % | HEART RATE: 94 BPM

## 2024-10-16 DIAGNOSIS — Z23 ENCOUNTER FOR REPEAT ADMINISTRATION OF RABIES VACCINATION: Primary | ICD-10-CM

## 2024-10-16 PROCEDURE — 90675 RABIES VACCINE IM: CPT | Performed by: EMERGENCY MEDICINE

## 2024-10-16 PROCEDURE — 99284 EMERGENCY DEPT VISIT MOD MDM: CPT | Performed by: EMERGENCY MEDICINE

## 2024-10-16 PROCEDURE — 90471 IMMUNIZATION ADMIN: CPT

## 2024-10-16 RX ADMIN — RABIES VIRUS STRAIN PM-1503-3M ANTIGEN (PROPIOLACTONE INACTIVATED) AND WATER 1 ML: KIT at 19:30

## 2024-10-18 NOTE — ED PROVIDER NOTES
Time reflects when diagnosis was documented in both MDM as applicable and the Disposition within this note       Time User Action Codes Description Comment    10/16/2024  7:24 PM Guanako Hua Add [Z23] Encounter for repeat administration of rabies vaccination           ED Disposition       ED Disposition   Discharge    Condition   Stable    Date/Time   Wed Oct 16, 2024  7:24 PM    Comment   Melvi Morrison discharge to home/self care.                   Assessment & Plan       Medical Decision Making  Risk  Prescription drug management.           Medications   rabies vaccine, human diploid IM injection 1 mL (1 mL Intramuscular Given 10/16/24 1930)       ED Risk Strat Scores                           SBIRT 20yo+      Flowsheet Row Most Recent Value   Initial Alcohol Screen: US AUDIT-C     1. How often do you have a drink containing alcohol? 0 Filed at: 10/16/2024 1917   2. How many drinks containing alcohol do you have on a typical day you are drinking?  0 Filed at: 10/16/2024 1917   3b. FEMALE Any Age, or MALE 65+: How often do you have 4 or more drinks on one occassion? 0 Filed at: 10/16/2024 1917   Audit-C Score 0 Filed at: 10/16/2024 1917   CLARA: How many times in the past year have you...    Used an illegal drug or used a prescription medication for non-medical reasons? Never Filed at: 10/16/2024 1917                            History of Present Illness       Chief Complaint   Patient presents with   • Follow Up Rabies     Pt presents to ER from home for 4th rabies vaccine s/p getting bit by a feral cat       History reviewed. No pertinent past medical history.   Past Surgical History:   Procedure Laterality Date   • RETINAL DETACHMENT REPAIR W/ SCLERAL BUCKLE LE Right 2020   • TONSILLECTOMY        Family History   Problem Relation Age of Onset   • Hodgkin's lymphoma Brother       Social History     Tobacco Use   • Smoking status: Never   • Smokeless tobacco: Never   Vaping Use   • Vaping status: Never Used    Substance Use Topics   • Alcohol use: Never   • Drug use: Never      E-Cigarette/Vaping   • E-Cigarette Use Never User       E-Cigarette/Vaping Substances   • Nicotine No    • THC No    • CBD No    • Flavoring No    • Other No    • Unknown No       I have reviewed and agree with the history as documented.     Here for final rabies vaccine. No medical concerns.     Review of Systems   Constitutional:  Negative for chills and fever.   Neurological:  Negative for weakness and numbness.         Objective       ED Triage Vitals [10/16/24 1919]   Temperature Pulse Blood Pressure Respirations SpO2 Patient Position - Orthostatic VS   98.4 °F (36.9 °C) 94 126/76 18 97 % Sitting      Temp Source Heart Rate Source BP Location FiO2 (%) Pain Score    Oral -- Left arm -- No Pain      Vitals      Date and Time Temp Pulse SpO2 Resp BP Pain Score FACES Pain Rating User   10/16/24 1919 98.4 °F (36.9 °C) 94 97 % 18 126/76 No Pain -- AM            Physical Exam  Vitals and nursing note reviewed.   Constitutional:       General: She is not in acute distress.     Appearance: She is well-developed. She is not diaphoretic.   HENT:      Head: Normocephalic and atraumatic.   Eyes:      General:         Right eye: No discharge.         Left eye: No discharge.      Conjunctiva/sclera: Conjunctivae normal.      Pupils: Pupils are equal, round, and reactive to light.   Neck:      Vascular: No JVD.   Pulmonary:      Breath sounds: No stridor.   Musculoskeletal:         General: No tenderness or deformity. Normal range of motion.      Cervical back: Normal range of motion and neck supple.   Skin:     General: Skin is warm and dry.      Capillary Refill: Capillary refill takes less than 2 seconds.   Neurological:      Mental Status: She is alert and oriented to person, place, and time.      Cranial Nerves: No cranial nerve deficit.      Sensory: No sensory deficit.      Motor: No abnormal muscle tone.      Coordination: Coordination normal.      Results Reviewed       None            No orders to display       Procedures    ED Medication and Procedure Management   Prior to Admission Medications   Prescriptions Last Dose Informant Patient Reported? Taking?   Norethin Ace-Eth Estrad-FE (BLISOVI 24 FE PO)   Yes No   Sig: Take by mouth   fluconazole (DIFLUCAN) 150 mg tablet   No No   Sig: Take 1 tablet (150 mg total) by mouth once as needed (yeast infection) for up to 1 dose   ondansetron (ZOFRAN) 4 mg tablet   No No   Sig: Take 1 tablet (4 mg total) by mouth every 6 (six) hours      Facility-Administered Medications: None     Discharge Medication List as of 10/16/2024  7:44 PM        CONTINUE these medications which have NOT CHANGED    Details   fluconazole (DIFLUCAN) 150 mg tablet Take 1 tablet (150 mg total) by mouth once as needed (yeast infection) for up to 1 dose, Starting Fri 10/4/2024, Until Wed 10/16/2024 at 2359, Normal      Norethin Ace-Eth Estrad-FE (BLISOVI 24 FE PO) Take by mouth, Historical Med      ondansetron (ZOFRAN) 4 mg tablet Take 1 tablet (4 mg total) by mouth every 6 (six) hours, Starting Wed 10/2/2024, Normal           No discharge procedures on file.  ED SEPSIS DOCUMENTATION   Time reflects when diagnosis was documented in both MDM as applicable and the Disposition within this note       Time User Action Codes Description Comment    10/16/2024  7:24 PM Guanako Hua Add [Z23] Encounter for repeat administration of rabies vaccination                  Guanako Hua MD  10/18/24 0627

## 2024-10-29 ENCOUNTER — TELEPHONE (OUTPATIENT)
Age: 34
End: 2024-10-29

## 2024-10-29 NOTE — LETTER
October 29, 2024      Dear Melvi,      Genevieve please find prep instructions for your scheduled colonoscopy on 11/15/24 at [facility], [address], [place], [address].    Please contact us at [phone] with any questions.         Sincerely,          [facility]                                                            COLONOSCOPY  MIRALAX/Dulcolax Bowel Preparation Instructions    The OR/GI Lab will contact you the evening prior to your procedure with your exact arrival time.    Our practice requires a 1 week notice for any cancellations or rescheduling. We kindly ask that you immediately notify us of any changes including any new medications that are prescribed. Thank you for your cooperation.     WEEK BEFORE YOUR PROCEDURE:  Stop taking Iron tablets.  5 days prior, AVOID vegetables and fruits with skins or seeds, nuts, corn, popcorn and whole grain breads.   Purchase: One (1) 238-gram container of Miralax (polyethylene glycol 3350), four (4) 5 mg Dulcolax (bisacodyl) tablets, and one (1) 64-ounce bottle of Gatorade (sports drink) - no red, orange, or purple. These may be purchased at any pharmacy without a prescription. Generic products are permissible.   Arrange responsible transportation for day of the procedure.     DAY BEFORE THE PROCEDURE:   CLEAR liquids only for entire day prior. Nothing red, orange or purple.    You MAY have:                                                               Soda  Water  Broth Gatorade  Jello  Popsicles Coffee/tea without milk/creamer     YOU MAY NOT HAVE:  Solid foods   Milk and milk products    Juice with pulp    BOWEL PREPARATION:  Includes: One (1) 238-gram container of Miralax (polyethylene glycol 3350), four (4) 5 mg Dulcolax (bisacodyl) tablets, and one (1) 64-ounce bottle of Gatorade (sports drink).  Preparation may be refrigerated.  Entire bowel prep should be completed.     Afternoon before the  procedure (2:00 pm - 5:00 pm):    Take two (2) 5 mg Dulcolax laxative tablets.     Evening before the procedure (6:00 pm):  Mix entire container of Miralax with one (1) 64-ounce bottle of Gatorade and shake until all medication is dissolved.   Begin drinking solution. Drink an eight (8) ounce cup every 10-15 minutes until you have consumed half (32 ounces) of the solution.  Refrigerate remaining solution.    Night before the procedure (8:00 pm):  Take two (2) 5 mg Dulcolax laxative tablets.     Beginning 5 hours before your procedure:  Drink the remaining amount of prepared solution (32 ounces).  Drink an eight (8) ounce cup every 10-15 minutes until you have consumed the remaining solution.     Bowel prep should be completed 4 hours prior to procedure time.    NOTHING TO EAT OR DRINK AFTER MIDNIGHT- EXCEPT FOR YOUR PREP    DAY OF THE PROCEDURE:  You may brush your teeth.  Leave all jewelry at home.  Please arrive for your procedure as indicated by the OR / GI Lab / Endoscopy Unit. The hospital will contact you the day before with your exact arrival time.   Make sure you have arranged ahead of time for a responsible adult (18 or older) to accompany and drive you home after the procedure.  Please discuss any transportation concerns with our staff prior to your procedure.    The effects of the anesthesia can persist for 24 hours.  After receiving the sedation, you must exercise caution before engaging in any activity that could harm yourself and others (such as driving a car).  Do not make any important decisions or do not drink any alcoholic beverages during this time period.  After your procedure, you may have anything you'd like to eat or drink.  You will probably want to start with something light.  Please include plenty of fluids.  Avoid items that cause gas such as sodas and salads.    SPECIAL INSTRUCTIONS:    For patients currently taking blood thinners and/or antiplatelet therapy our office will contact the  prescribing provider.  Our office will contact you with any required changes to your medication regimen.     Blood thinner (i.e. - Coumadin, Pradaxa, Lovenox, Xarelto, Eliquis)  ?  Continue (Do Not Stop)  ? Stop______________for_____________days prior to the procedure.    Antiplatelet (i.e. - Plavix, Aggrenox, Effient, Brilinta)  ?  Continue (Do Not Stop)  ? Stop______________for_____________days prior to the procedure.       Diabetes:   If you are Diabetic, please see separate Diabetic Instruction Sheet.          Prescribed medications:  Do not stop your aspirin, or any of your other medications (unless instructed otherwise).    Take the rest of your prescribed medications with small sips of water at least 2 hours prior to your procedure.      For any questions or concerns related to your bowel preparation or pre-procedure instructions, please contact our office at [phone].  Thank you for choosing [facility]!                 DIABETIC INSTRUCTIONS      Medicine Instructions for Adults with Diabetes who Need a Bowel Prep       Follow these instructions when a BOWEL PREP is required for your procedure or surgery!    NOTE:   GLP-1 Agonists taken weekly: do not take in the 7 days before your procedure   SGLT-2 Inhibitors: do not take in the 4 days before your procedure     On the Day Before Surgery/Procedure  If you are having a procedure (e.g. Colonoscopy) or surgery that requires a bowel prep and you may have at least a clear liquid diet, follow the directions below based on the type of medicine you take for your diabetes.     Type of Medicine You Take Examples What to do   Pre-Mixed Insulin - Intermediate Acting Humalog® 75/25, Humulin® 70/30, Novolog® 70/30, Regular Insulin Take ½ your regular dose the evening before your procedure   Rapid/Fast Acting Insulin Humalog® U200, NovoLog®, Apidra®, Fiasp® Take ½ your regular dose the evening before your procedure.   Long-Acting Insulin Lantus®,  Levemir®, Tresiba®, Toujeo®, Basaglar® Take your FULL regular dose the day before procedure   Oral Sulfonylurea Glipizide/Glimepiride/Glucotrol® Take ½ your regular dose the evening before your procedure   Other Oral Diabetes Medicines Metformin®, Glucophage®, Glucophage XR®, Riomet®, Glumetza®), Actose®, Avandia®, Glyset®, Prandin® Take your regular dose with dinner in the evening before your procedure   GLP-1 Agonists AdlyxinÒ, ByettaÒ, BydureonÒ, OzempicÒ, SoliquaÒ, TanzeumÒ, TrulicityÒ, VictozaÒ, Saxenda®, Rybelsus® If taken daily, take as normal    If taken weekly, do not take this medicine for 7 days before your procedure including the day of the procedure (resume taking after the procedure)   SGLT-2 Inhibitors Jardiance®, Invokana®, Farxiga®,   Steglatro®, Brenzavvy®, Qtern®, Segluromet®, Glyxambi®, Synjardy®, Synjardy XR®, Invokamet®, Invokamet XR®, Trijary XR®, Xigduo XR®, Steglujan® Do not take for 4 days before your procedure including the day of the procedure (resume taking after the procedure)                More information continued on back                    Medicine Instructions for Adults with Diabetes who Need a Bowel Prep  Page 2      On the Day of Surgery/Procedure  Follow the directions below based on the type of medicine you take for your diabetes.     Type of Medicine You Take Examples What to do   Long-Acting Insulin Lantus®, Levemir®, Tresiba®, Toujeo®, Basaglar®, Semglee®   If you usually take your Long-Acting Insulin in the morning, take the full dose as scheduled.   GLP-1 Agonists AdlyxinÒ, ByettaÒ, BydureonÒ, OzempicÒ, SoliquaÒ, TanzeumÒ, TrulicityÒ, VictozaÒ, Saxenda®, Rybelsus® Do NOT take this medicine on the day of your procedure (resume taking after the procedure)       On the Day of Surgery/Procedure (continued)  Except for the morning Long-Acting Insulin, DO NOT take ANY diabetic medicine on the day of your procedure unless you were instructed by the doctor who manages your  diabetes medicines.    Continue to check your blood sugars.  If you have an insulin pump, ask your endocrinologist for instructions at least 3 days before your procedure. NOTE: If you are not able to ask your endocrinologist in advance, on the day of the procedure set your insulin pump to your basal rate only. Bring your insulin pump supplies to the hospital.     If you have any questions about taking your diabetes medicines prior to your procedure, please contact the doctor who manages your diabetes medicines.

## 2024-10-29 NOTE — TELEPHONE ENCOUNTER
Pt rescheduled her colonoscopy for 11/15/24 w/ Dr. Goodson. I sent kinjal/dul prep instructions via [employer] letters, pt request to send them. Pt aware that will be located under letters.

## 2024-11-06 ENCOUNTER — OFFICE VISIT (OUTPATIENT)
Dept: OBGYN CLINIC | Facility: CLINIC | Age: 34
End: 2024-11-06
Payer: COMMERCIAL

## 2024-11-06 VITALS — BODY MASS INDEX: 27.32 KG/M2 | HEIGHT: 66 IN | WEIGHT: 170 LBS
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
## 2024-11-06 DIAGNOSIS — M79.2 NEURITIS OF UPPER EXTREMITY: Primary | ICD-10-CM

## 2024-11-06 PROCEDURE — 99203 OFFICE O/P NEW LOW 30 MIN: CPT | Performed by: SURGERY

## 2024-11-06 RX ORDER — CLOTRIMAZOLE 10 MG/1
10 LOZENGE ORAL
COMMUNITY
Start: 2024-10-30 | End: 2024-11-09

## 2024-11-06 RX ORDER — FLUCONAZOLE 150 MG/1
TABLET ORAL
COMMUNITY
Start: 2024-10-30

## 2024-11-06 NOTE — PROGRESS NOTES
Navya Dominique M.D.  Attending, Orthopaedic Surgery  Hand, Wrist, and Elbow Surgery  [facility]      ORTHOPAEDIC HAND, WRIST, AND ELBOW OFFICE  VISIT       ASSESSMENT/PLAN:      35 yo female s/p cat bite to the right wrist now with irritation and dorsal radial sensory branch irritation     Patient sustained cat bite in the area of the dorsal radial sensory nerve which has since developed some scar tissue adherence and irritation. No residual signs of infection at this time. I do suspect this will improve greatly with therapy and scar modalities, referral was placed today. Advised that scar tissue and nerve irritation can take  months to improve, but therapy should help. We will plan to re-evaluate her in about 7-8 weeks     The patient verbalized understanding of exam findings and treatment plan. We engaged in the shared decision-making process and treatment options were discussed at length with the patient. Surgical and conservative management discussed today along with risks and benefits.    Diagnoses and all orders for this visit:    Neuritis of upper extremity  -     Ambulatory Referral to PT/OT Hand Therapy; Future    Cat bite, sequela  -     Ambulatory Referral to PT/OT Hand Therapy; Future    Other orders  -     clotrimazole (MYCELEX) 10 mg anna; Take 10 mg by mouth 5 (five) times a day (Patient not taking: Reported on 11/6/2024)  -     fluconazole (DIFLUCAN) 150 mg tablet; TAKE 1 TAB NOW, AND IF SYMPTOMS REMAIN 3 DAYS, TAKE AN ADDITIONAL 1 TAB (Patient not taking: Reported on 11/6/2024)        Follow Up:  Return in about 8 weeks (around 1/1/2025) for Recheck.    To Do Next Visit:  Re-evaluation of current issue        ____________________________________________________________________________________________________________________________________________      CHIEF COMPLAINT:  Chief Complaint   Patient presents with    Right Wrist - Pain     Patient was bit by a cat a couple  weeks ago she is still having pain in the wrist. She was given oral antibiotics which did not work she was then admitted for IV antibiotics        SUBJECTIVE:  Melvi Morrison is a 34 y.o. year old RHD female who presents for evaluation of the right wrist. She notes she got bitten by a feral cat about a month ago. She required IV abx to clear her infection but did not need to undergo surgery. Since the injury she has noticed pain in the area of the bite and a shooting nerve pain that travels into the dorsal index and thumb when she touches the area. She reports needing to move the wrist more than normal due to feelings of stiffness. No fever or chills. She is off abx at this time       Pain/symptom timing:  Worse during the day when active  Pain/symptom context:  Worse with activites and work  Pain/symptom modifying factors:  Rest makes better, activities make worse  Pain/symptom associated signs/symptoms: none    Prior treatment   NSAIDsYes   Injections No   Bracing/Orthotics No    Physical Therapy No     I have personally reviewed all the relevant PMH, PSH, SH, FH, Medications and allergies      PAST MEDICAL HISTORY:  History reviewed. No pertinent past medical history.    PAST SURGICAL HISTORY:  Past Surgical History:   Procedure Laterality Date    RETINAL DETACHMENT REPAIR W/ SCLERAL BUCKLE LE Right 2020    TONSILLECTOMY         FAMILY HISTORY:  Family History   Problem Relation Age of Onset    Hodgkin's lymphoma Brother        SOCIAL HISTORY:  Social History     Tobacco Use    Smoking status: Never    Smokeless tobacco: Never   Vaping Use    Vaping status: Never Used   Substance Use Topics    Alcohol use: Never    Drug use: Never       MEDICATIONS:    Current Outpatient Medications:     ondansetron (ZOFRAN) 4 mg tablet, Take 1 tablet (4 mg total) by mouth every 6 (six) hours, Disp: 12 tablet, Rfl: 0    clotrimazole (MYCELEX) 10 mg anna, Take 10 mg by mouth 5 (five) times a day (Patient not taking: Reported on  11/6/2024), Disp: , Rfl:     fluconazole (DIFLUCAN) 150 mg tablet, TAKE 1 TAB NOW, AND IF SYMPTOMS REMAIN 3 DAYS, TAKE AN ADDITIONAL 1 TAB (Patient not taking: Reported on 11/6/2024), Disp: , Rfl:     Norethin Ace-Eth Estrad-FE (BLISOVI 24 FE PO), Take by mouth (Patient not taking: Reported on 11/6/2024), Disp: , Rfl:     ALLERGIES:  Allergies   Allergen Reactions    Unasyn [Ampicillin-Sulbactam Sodium] Hives     Pruritus and ? hives    Cefaclor Hives     Tolerating ceftriaxone 10/2024    Ciprofloxacin      Other reaction(s): hives    Sulfa Antibiotics            REVIEW OF SYSTEMS:  Review of Systems   Constitutional:  Negative for chills, fatigue and fever.   HENT:  Negative for hearing loss, nosebleeds and sore throat.    Eyes:  Negative for redness and visual disturbance.   Respiratory:  Negative for cough, shortness of breath and wheezing.    Cardiovascular:  Negative for chest pain, palpitations and leg swelling.   Gastrointestinal:  Negative for abdominal pain, nausea and vomiting.   Endocrine: Negative for polydipsia and polyuria.   Genitourinary:  Negative for difficulty urinating, dysuria and hematuria.   Musculoskeletal:  Negative for arthralgias, joint swelling and myalgias.   Skin:  Negative for rash and wound.   Neurological:  Positive for numbness. Negative for speech difficulty, weakness and headaches.   Psychiatric/Behavioral:  Negative for decreased concentration and suicidal ideas. The patient is not nervous/anxious.        VITALS:  There were no vitals filed for this visit.    LABS:      _____________________________________________________  PHYSICAL EXAMINATION:  General: well developed and well nourished, alert, oriented times 3, and appears comfortable  Psychiatric: Normal  HEENT: Normocephalic, Atraumatic Trachea Midline, No torticollis  Pulmonary: No audible wheezing or respiratory distress   Abdomen/GI: Non tender, non distended   Cardiovascular: No pitting edema, 2+ radial pulse   Skin: See  below  Neurovascular: Sensation Intact to the Median, Ulnar, Radial Nerve, Motor Intact to the Median, Ulnar, Radial Nerve, and Pulses Intact  Musculoskeletal: Normal, except as noted in detailed exam and in HPI.      MUSCULOSKELETAL EXAMINATION:  Right wrist:  Healed puncture wounds over the dorsal radial wrist  Palpable scar tissue formation in the area  + Tinels over the scar in the distribution of the dorsal radial sensory branch   Full wrist and hand motion     ___________________________________________________  STUDIES REVIEWED:  No relevant studies to review        LABS REVIEWED:    HgA1c:   Lab Results   Component Value Date    HGBA1C 5.0 07/18/2024     BMP:   Lab Results   Component Value Date    GLUCOSE 93 07/24/2015    CALCIUM 9.4 10/03/2024     07/24/2015    K 4.0 10/03/2024    CO2 24 10/03/2024     10/03/2024    BUN 11 10/03/2024    CREATININE 0.76 10/03/2024               PROCEDURES PERFORMED:  Procedures  No Procedures performed today    _____________________________________________________      Scribe Attestation      I,:  Eliza Hale PA-C am acting as a scribe while in the presence of the attending physician.:       I,:  Navya Dominique MD personally performed the services described in this documentation    as scribed in my presence.:

## 2024-12-26 ENCOUNTER — ULTRASOUND (OUTPATIENT)
Dept: OBGYN CLINIC | Facility: CLINIC | Age: 34
End: 2024-12-26
Payer: COMMERCIAL

## 2024-12-26 ENCOUNTER — TELEPHONE (OUTPATIENT)
Age: 34
End: 2024-12-26

## 2024-12-26 VITALS
WEIGHT: 179.6 LBS | SYSTOLIC BLOOD PRESSURE: 116 MMHG | BODY MASS INDEX: 28.87 KG/M2 | DIASTOLIC BLOOD PRESSURE: 72 MMHG | HEIGHT: 66 IN

## 2024-12-26 DIAGNOSIS — N91.2 AMENORRHEA: Primary | ICD-10-CM

## 2024-12-26 PROBLEM — L03.113 CELLULITIS OF RIGHT UPPER EXTREMITY: Status: RESOLVED | Noted: 2024-10-03 | Resolved: 2024-12-26

## 2024-12-26 PROCEDURE — 99203 OFFICE O/P NEW LOW 30 MIN: CPT | Performed by: PHYSICIAN ASSISTANT

## 2024-12-26 PROCEDURE — 76817 TRANSVAGINAL US OBSTETRIC: CPT | Performed by: PHYSICIAN ASSISTANT

## 2024-12-26 RX ORDER — SACCHAROMYCES BOULARDII 250 MG
250 CAPSULE ORAL 2 TIMES DAILY
COMMUNITY

## 2024-12-26 NOTE — PROGRESS NOTES
Assessment/Plan:  - Viable IUP @ 8w 1d EGA  - ORIANA 25  - Continue PNV  - Patient to call for concerns  - RTO 3 weeks for OB intake    Encounter Diagnosis     ICD-10-CM    1. Amenorrhea  N91.2 Ambulatory Referral to Maternal Fetal Medicine     [facility] OB < 14 weeks single or first gestation level 1              Subjective:       Patient ID: Melvi Morrison [id number]        Melvi Morrison is a 34 y.o.  presenting to the office for pregnancy confirmation. Patient's last menstrual period was 10/30/2024 (exact date). , placing her at 8w1d today with ORIANA of 25. She is feeling tired today        OB History    Para Term  AB Living   1        SAB IAB Ectopic Multiple Live Births             # Outcome Date GA Lbr Hiren/2nd Weight Sex Type Anes PTL Lv   1 Current                  The following portions of the patient's history were reviewed and updated as appropriate:   She  has a past medical history of Abnormal Pap smear of cervix.    She   Patient Active Problem List    Diagnosis Date Noted    HGSIL (high grade squamous intraepithelial lesion) on Pap smear of cervix 2023     She  has a past surgical history that includes Tonsillectomy; Retinal detachment repair w/ scleral buckle (Right, ); and Cervical biopsy w/ loop electrode excision (10/17/2023).    Her family history includes Hodgkin's lymphoma in her brother.    She  reports that she has never smoked. She has never used smokeless tobacco. She reports that she does not drink alcohol and does not use drugs.    Current Outpatient Medications   Medication Sig Dispense Refill    Ferrous Sulfate (IRON PO) Take 1 tablet by mouth daily      saccharomyces boulardii (Florastor) 250 mg capsule Take 250 mg by mouth 2 (two) times a day      ondansetron (ZOFRAN) 4 mg tablet Take 1 tablet (4 mg total) by mouth every 6 (six) hours (Patient not taking: Reported on 2024) 12 tablet 0     No current facility-administered medications for this visit.     Current  "Outpatient Medications on File Prior to Visit   Medication Sig    Ferrous Sulfate (IRON PO) Take 1 tablet by mouth daily    saccharomyces boulardii (Florastor) 250 mg capsule Take 250 mg by mouth 2 (two) times a day    ondansetron (ZOFRAN) 4 mg tablet Take 1 tablet (4 mg total) by mouth every 6 (six) hours (Patient not taking: Reported on 11/6/2024)    [DISCONTINUED] fluconazole (DIFLUCAN) 150 mg tablet TAKE 1 TAB NOW, AND IF SYMPTOMS REMAIN 3 DAYS, TAKE AN ADDITIONAL 1 TAB (Patient not taking: Reported on 11/6/2024)    [DISCONTINUED] Norethin Ace-Eth Estrad-FE (BLISOVI 24 FE PO) Take by mouth (Patient not taking: Reported on 11/6/2024)     No current facility-administered medications on file prior to visit.     She is allergic to unasyn [ampicillin-sulbactam sodium], cefaclor, ciprofloxacin, and sulfa antibiotics..    Allergies:  Unasyn [ampicillin-sulbactam sodium], Cefaclor, Ciprofloxacin, and Sulfa antibiotics    Medications:    Current Outpatient Medications:     Ferrous Sulfate (IRON PO), Take 1 tablet by mouth daily, Disp: , Rfl:     saccharomyces boulardii (Florastor) 250 mg capsule, Take 250 mg by mouth 2 (two) times a day, Disp: , Rfl:     ondansetron (ZOFRAN) 4 mg tablet, Take 1 tablet (4 mg total) by mouth every 6 (six) hours (Patient not taking: Reported on 11/6/2024), Disp: 12 tablet, Rfl: 0      Review of Systems:   Review of Systems   Constitutional:  Positive for fatigue.   Genitourinary:  Negative for vaginal bleeding.          Objective:       Visit Vitals  /72 (BP Location: Left arm, Patient Position: Sitting, Cuff Size: Standard)   Ht 5' 6\" (1.676 m)   Wt 81.5 kg (179 lb 9.6 oz)   LMP 10/30/2024 (Exact Date)   BMI 28.99 kg/m²   OB Status Pregnant   Smoking Status Never   BSA 1.91 m²        GEN: The patient was alert and oriented x3, pleasant well-appearing female in no acute distress.   PULM: nonlabored respirations  MSK: Normal gait  : WNL  Skin: warm, dry  Neuro: no focal " deficits  Psych: normal affect and judgement, cooperative    Ultrasound:     Viability US     Gestational sac: present               Location: intrauterine  Yolk sac: present  Fetal pole: present               CRL: 1.92 cm = 8w3d  Cardiac activity: present               Rate: 181 bpm     Ovaries: normal appearing bilaterally  Cul de sac: absence of free fluid  Uterus: normal in appearance           Ultrasound Probe Disinfection    A transvaginal ultrasound was performed.   Prior to use, disinfection was performed with High Level Disinfection Process (Trophon)  Probe serial number [id number]: [id number] was used    Cathy Caraballo PA-C  12/26/24  8:57 AM      I have spent a total time of 30 minutes in caring for this patient on the day of the visit/encounter including Patient and family education, Counseling / Coordination of care, Documenting in the medical record, Reviewing / ordering tests, medicine, procedures  , and Obtaining or reviewing history  .

## 2025-01-15 NOTE — PATIENT INSTRUCTIONS
Congratulations!! Please review our Pregnancy Essential Guide and [facility] L&D Virtual tour from our networks website.     [facility] Pregnancy Essentials Guide  [facility] ([email or web address])     [facility] ([employer]

## 2025-01-16 ENCOUNTER — INITIAL PRENATAL (OUTPATIENT)
Dept: OBGYN CLINIC | Facility: CLINIC | Age: 35
End: 2025-01-16

## 2025-01-16 ENCOUNTER — APPOINTMENT (OUTPATIENT)
Dept: LAB | Facility: AMBULARY SURGERY CENTER | Age: 35
End: 2025-01-16
Payer: COMMERCIAL

## 2025-01-16 VITALS
HEIGHT: 66 IN | SYSTOLIC BLOOD PRESSURE: 118 MMHG | DIASTOLIC BLOOD PRESSURE: 72 MMHG | BODY MASS INDEX: 29.41 KG/M2 | WEIGHT: 183 LBS

## 2025-01-16 DIAGNOSIS — Z34.81 PRENATAL CARE, SUBSEQUENT PREGNANCY, FIRST TRIMESTER: Primary | ICD-10-CM

## 2025-01-16 DIAGNOSIS — Z34.81 PRENATAL CARE, SUBSEQUENT PREGNANCY, FIRST TRIMESTER: ICD-10-CM

## 2025-01-16 DIAGNOSIS — Z31.430 ENCOUNTER OF FEMALE FOR TESTING FOR GENETIC DISEASE CARRIER STATUS FOR PROCREATIVE MANAGEMENT: ICD-10-CM

## 2025-01-16 DIAGNOSIS — Z36.9 ENCOUNTER FOR ANTENATAL SCREENING: ICD-10-CM

## 2025-01-16 LAB
ABO GROUP BLD: NORMAL
BACTERIA UR QL AUTO: NORMAL /HPF
BASOPHILS # BLD AUTO: 0.03 THOUSANDS/ΜL (ref 0–0.1)
BASOPHILS NFR BLD AUTO: 0 % (ref 0–1)
BILIRUB UR QL STRIP: NEGATIVE
BLD GP AB SCN SERPL QL: NEGATIVE
CLARITY UR: CLEAR
COLOR UR: NORMAL
EOSINOPHIL # BLD AUTO: 0.06 THOUSAND/ΜL (ref 0–0.61)
EOSINOPHIL NFR BLD AUTO: 1 % (ref 0–6)
ERYTHROCYTE [DISTWIDTH] IN BLOOD BY AUTOMATED COUNT: 12.5 % (ref 11.6–15.1)
GLUCOSE UR STRIP-MCNC: NEGATIVE MG/DL
HCT VFR BLD AUTO: 42.8 % (ref 34.8–46.1)
HGB BLD-MCNC: 14.5 G/DL (ref 11.5–15.4)
HGB UR QL STRIP.AUTO: NEGATIVE
IMM GRANULOCYTES # BLD AUTO: 0.07 THOUSAND/UL (ref 0–0.2)
IMM GRANULOCYTES NFR BLD AUTO: 1 % (ref 0–2)
KETONES UR STRIP-MCNC: NEGATIVE MG/DL
LEUKOCYTE ESTERASE UR QL STRIP: NEGATIVE
LYMPHOCYTES # BLD AUTO: 1.69 THOUSANDS/ΜL (ref 0.6–4.47)
LYMPHOCYTES NFR BLD AUTO: 17 % (ref 14–44)
MCH RBC QN AUTO: 29.9 PG (ref 26.8–34.3)
MCHC RBC AUTO-ENTMCNC: 33.9 G/DL (ref 31.4–37.4)
MCV RBC AUTO: 88 FL (ref 82–98)
MONOCYTES # BLD AUTO: 0.67 THOUSAND/ΜL (ref 0.17–1.22)
MONOCYTES NFR BLD AUTO: 7 % (ref 4–12)
NEUTROPHILS # BLD AUTO: 7.37 THOUSANDS/ΜL (ref 1.85–7.62)
NEUTS SEG NFR BLD AUTO: 74 % (ref 43–75)
NITRITE UR QL STRIP: NEGATIVE
NON-SQ EPI CELLS URNS QL MICRO: NORMAL /HPF
NRBC BLD AUTO-RTO: 0 /100 WBCS
PH UR STRIP.AUTO: 6.5 [PH]
PLATELET # BLD AUTO: 322 THOUSANDS/UL (ref 149–390)
PMV BLD AUTO: 9.5 FL (ref 8.9–12.7)
PROT UR STRIP-MCNC: NEGATIVE MG/DL
RBC # BLD AUTO: 4.85 MILLION/UL (ref 3.81–5.12)
RBC #/AREA URNS AUTO: NORMAL /HPF
RH BLD: POSITIVE
RUBV IGG SERPL IA-ACNC: 18.6 IU/ML
SP GR UR STRIP.AUTO: 1.01 (ref 1–1.03)
UROBILINOGEN UR STRIP-ACNC: <2 MG/DL
WBC # BLD AUTO: 9.89 THOUSAND/UL (ref 4.31–10.16)
WBC #/AREA URNS AUTO: NORMAL /HPF

## 2025-01-16 PROCEDURE — 87340 HEPATITIS B SURFACE AG IA: CPT

## 2025-01-16 PROCEDURE — 86900 BLOOD TYPING SEROLOGIC ABO: CPT

## 2025-01-16 PROCEDURE — 85025 COMPLETE CBC W/AUTO DIFF WBC: CPT

## 2025-01-16 PROCEDURE — 86901 BLOOD TYPING SEROLOGIC RH(D): CPT

## 2025-01-16 PROCEDURE — 86762 RUBELLA ANTIBODY: CPT

## 2025-01-16 PROCEDURE — OBC

## 2025-01-16 PROCEDURE — 86850 RBC ANTIBODY SCREEN: CPT

## 2025-01-16 PROCEDURE — 87086 URINE CULTURE/COLONY COUNT: CPT

## 2025-01-16 PROCEDURE — 86803 HEPATITIS C AB TEST: CPT

## 2025-01-16 PROCEDURE — 36415 COLL VENOUS BLD VENIPUNCTURE: CPT

## 2025-01-16 PROCEDURE — 87389 HIV-1 AG W/HIV-1&-2 AB AG IA: CPT

## 2025-01-16 PROCEDURE — 86780 TREPONEMA PALLIDUM: CPT

## 2025-01-16 PROCEDURE — 81001 URINALYSIS AUTO W/SCOPE: CPT

## 2025-01-16 NOTE — PROGRESS NOTES
OB INTAKE INTERVIEW  Patient is 34 y.o. who presents for OB intake at 11 1/7 wks  She is accompanied by herself during this encounter  The father of her baby Jluis West is involved in the pregnancy and is 36 years old.      Last Menstrual Period: 10/30/2024  Ultrasound: Measured 8weeks 3 days on 24  Estimated Date of Delivery: 2025 confirmed by 8 week US    Signs/Symptoms of Pregnancy  Current pregnancy symptoms: Mild Nausea  Constipation no  Headaches YES- mild  Cramping/spotting no  PICA cravings no    Diabetes-  There is no height or weight on file to calculate BMI.  If patient has 1 or more, please order early 1 hour GTT  History of GDM no  BMI >35 no  History of PCOS or current metformin use (should stop for 7 days prior to 1hr GTT unless pre-existing diabetes)  no  History of LGA/macrosomic infant (4000g/9lbs) no    If patient has 2 or more, please order early 1 hour GTT  BMI>30 no  AMA no  First degree relative with type 2 diabetes no  History of chronic HTN, hyperlipidemia, elevated A1C no  High risk race (, , ,  or ) no    Hypertension- if you answer yes to any of the following, please order baseline preeclampsia labs (cbc, comprehensive metabolic panel, urine protein creatinine ratio, uric acid)  History of of chronic HTN no  History of gestational HTN no  History of preeclampsia, eclampsia, or HELLP syndrome no  History of diabetes no  History of lupus,sjogrens syndrome, kidney disease no    Thyroid- if yes order TSH with reflex T4  History of thyroid disease no    Bleeding Disorder or Hx of DVT-patient or first degree relative with history of. Order the following if not done previously.   (Factor V, antithrombin III, prothrombin gene mutation, protein C and S Ag, lupus anticoagulant, anticardiolipin, beta-2 glycoprotein)   no    OB/GYN-  History of abnormal pap smear YES       Date of last pap smear 24  History of HPV  YES  History of Herpes/HSV no  History of other STI (gonorrhea, chlamydia, trich) no  History of prior  no  History of prior  no  History of  delivery prior to 36 weeks 6 days no  History of Varicella or Vaccination Vaccinated  History of blood transfusion no  Ok for blood transfusion YES    Substance screening-   History of tobacco use no  Currently using tobacco no  Substance Use Screen Level (N/A, LOW, HIGH) N/A    MRSA Screening-   Does the pt have a hx of MRSA? no    Immunizations:  Influenza vaccine given this season Not Interested  Discussed Tdap vaccine YES  Discussed COVID Vaccine YES    Genetic/[facility]-  Do you or your partner have a history of any of the following in yourselves or first degree relatives?  Cystic fibrosis no  Spinal muscular atrophy no  Hemoglobinopathy/Sickle Cell/Thalassemia no  Fragile X Intellectual Disability no      If no, discuss Carrier Screening being completed once in a lifetime as a standard of care lab test. Place orders for Cystic Fibrosis Gene Test (GBH843) and Spinal Muscular Atrophy DNA (WZS3500)      Appointment for Nuchal Translucency Ultrasound at [facility] scheduled for 25      Interview education  [facility] Pregnancy Essentials Book reviewed, discussed and attached to their AVS YES    Nurse/Family Partnership- patient may qualify NO; referral placed NO    Prenatal lab work scripts YES  Extra labs ordered:  CF and SMA screening    Aspirin/Preeclampsia Screen    Risk Level Risk Factor Recommendation   LOW Prior Uncomplicated full-term delivery no No Aspirin recommendation        MODERATE Nulliparity YES Recommend low-dose aspirin if     BMI>30 no 2 or more moderate risk factors    Family History Preeclampsia (mother/sister) no     35yr old or greater no     Black Race, Concern for SDOH/Low Socioeconomic no     IVF Pregnancy  no     Personal History Risks (low birth weight, prior adverse preg outcome, >10yr preg interval) no         HIGH History of  Preeclampsia no Recommend low-dose aspirin if     Multifetal gestation no 1 or more high risk factors    Chronic HTN no     Type 1 or 2 Diabetes no     Renal Disease no     Autoimmune Disease  no      Contraindications to ASA therapy:  NSAID/ ASA allergy: no  Nasal polyps: no  Asthma with history of ASA induced bronchospasm: no  Relative contraindications:  History of GI bleed: no  Active peptic ulcer disease: no  Severe hepatic dysfunction: no    Patient should be recommended to take ASA 162mg during this pregnancy from 12-36wks to lower her risk of preeclampsia: N/A          The patient has a history now or in prior pregnancy notable for:  History HPV, Anxiety-EPDS-2      Details that I feel the provider should be aware of: This is an unplanned however welcomed pregnancy for parents. Patient is feeling generally well with some occasional nausea. OTC medications and diet were discussed. Patient knows to call OB for symptoms and how to contact her nurse navigator. Patient was made aware to have lab orders completed before next appointment. Patient denies any questions at this point and verbalizes understanding.         PN1 visit scheduled. The patient was oriented to our practice, the navigator role, reviewed delivering physicians and [facility] for Delivery. All questions were answered.    Interviewed by: Tova Jc RN

## 2025-01-17 ENCOUNTER — RESULTS FOLLOW-UP (OUTPATIENT)
Dept: OBGYN CLINIC | Facility: CLINIC | Age: 35
End: 2025-01-17

## 2025-01-17 LAB
BACTERIA UR CULT: NORMAL
HBV SURFACE AG SER QL: NORMAL
HCV AB SER QL: NORMAL
HIV 1+2 AB+HIV1 P24 AG SERPL QL IA: NORMAL
HIV 2 AB SERPL QL IA: NORMAL
HIV1 AB SERPL QL IA: NORMAL
HIV1 P24 AG SERPL QL IA: NORMAL
TREPONEMA PALLIDUM IGG+IGM AB [PRESENCE] IN SERUM OR PLASMA BY IMMUNOASSAY: NORMAL

## 2025-01-19 PROBLEM — Z3A.12 12 WEEKS GESTATION OF PREGNANCY: Status: ACTIVE | Noted: 2025-01-19

## 2025-01-19 PROBLEM — Z98.890 HISTORY OF LOOP ELECTROSURGICAL EXCISION PROCEDURE (LEEP) OF CERVIX AFFECTING PREGNANCY, ANTEPARTUM: Status: ACTIVE | Noted: 2025-01-19

## 2025-01-19 PROBLEM — O34.40 HISTORY OF LOOP ELECTROSURGICAL EXCISION PROCEDURE (LEEP) OF CERVIX AFFECTING PREGNANCY, ANTEPARTUM: Status: ACTIVE | Noted: 2025-01-19

## 2025-01-24 ENCOUNTER — ROUTINE PRENATAL (OUTPATIENT)
Dept: PERINATAL CARE | Facility: OTHER | Age: 35
End: 2025-01-24
Payer: COMMERCIAL

## 2025-01-24 VITALS
HEART RATE: 105 BPM | BODY MASS INDEX: 29.73 KG/M2 | WEIGHT: 185 LBS | SYSTOLIC BLOOD PRESSURE: 120 MMHG | HEIGHT: 66 IN | DIASTOLIC BLOOD PRESSURE: 78 MMHG

## 2025-01-24 DIAGNOSIS — N91.2 AMENORRHEA: ICD-10-CM

## 2025-01-24 DIAGNOSIS — Z36.9 ENCOUNTER FOR ANTENATAL SCREENING OF MOTHER: ICD-10-CM

## 2025-01-24 DIAGNOSIS — Z3A.12 12 WEEKS GESTATION OF PREGNANCY: ICD-10-CM

## 2025-01-24 DIAGNOSIS — Z36.82 ENCOUNTER FOR (NT) NUCHAL TRANSLUCENCY SCAN: ICD-10-CM

## 2025-01-24 DIAGNOSIS — O34.40 HISTORY OF LOOP ELECTROSURGICAL EXCISION PROCEDURE (LEEP) OF CERVIX AFFECTING PREGNANCY, ANTEPARTUM: Primary | ICD-10-CM

## 2025-01-24 DIAGNOSIS — Z98.890 HISTORY OF LOOP ELECTROSURGICAL EXCISION PROCEDURE (LEEP) OF CERVIX AFFECTING PREGNANCY, ANTEPARTUM: Primary | ICD-10-CM

## 2025-01-24 PROCEDURE — 36415 COLL VENOUS BLD VENIPUNCTURE: CPT | Performed by: OBSTETRICS & GYNECOLOGY

## 2025-01-24 PROCEDURE — 99203 OFFICE O/P NEW LOW 30 MIN: CPT | Performed by: OBSTETRICS & GYNECOLOGY

## 2025-01-24 PROCEDURE — 76813 OB US NUCHAL MEAS 1 GEST: CPT | Performed by: OBSTETRICS & GYNECOLOGY

## 2025-01-24 NOTE — PROGRESS NOTES
Patient chose to have [facility] [id number] Non-Invasive Prenatal Screen [id number] PLUS w/ SCA, WITH fetal sex.  Patient choose to be billed through insurance.     Patient given brochure and is aware [facility] will contact patient's insurance and coordinate coverage.  Provided [facility] contact information. General inquiries [phone], Cost estimates [phone] and [facility] Billing [phone]. Website [email or web address].     Blood collection tubes labeled with patient identifiers (name, medical record number, and date of birth).     Filled out [facility] order form. Patient chose to have blood drawn in our office at time of visit. NIPS was drawn from left arm with a butterfly needle by KRZYSZTOF Portillo. .      If patient chose to have blood work drawn at a [facility] lab we requested patient notify MFM (via phone call or 4-Tell message) when blood collected so office can follow up on results.       [facility] will have results in approximately 5-7 business days and will call patient or notify via 4-Tell.  Patient aware viewing lab result online will reveal fetal sex if ordered.    Patient verbalized understanding of all instructions and no questions at this time.

## 2025-01-24 NOTE — PROGRESS NOTES
As per [employer] Portal has  authorized for your NIPS TEST CODE 369993 genetic screening has been approved. Auth # is [id number] with dates of service 01/24/25 - 04/201/25.    Insurance Authorization is not a guarantee of payment and final determination is based on your member benefits, appropriateness of service provided and eligibility at time of services rendered and claim received. Please follow up with [facility] for your OOP copay.     Patient had her blood work done at [facility] office.     Authorization approval letter is scanned into Epic sytem

## 2025-01-24 NOTE — PROGRESS NOTES
OFFICE CONSULT  Referring physician:   Cathy Caraballo Pa-c  [address]  [address]  [place] [address]      Dear Cathy Caraballo      Thank you for requesting a  consultation on your patient Ms. 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  She reports normal Pap smears since  Past surgical history: LEEP and history of a retinal detachment repair with scleral buckle on the right and tonsillectomy  Past obstetrical history:  1 para 0  Social history: Reports no substance use  First generation family history: None reported    Ultrasound findings:  The ultrasound shows a fetus concordant with dates. The nasal bone and nuchal translucency appears normal. No malformations are seen on today's early ultrasound.  The fetal chin view and three-vessel cord view were limited secondary to fetal position.  Using color Doppler we were able to see 2 blood vessels in the cord [id number] that were running in the same direction suggesting that there must be a three-vessel cord.     The patient was informed of the findings and counseled about the limitations of the exam in detecting all forms of fetal congenital abnormalities.    She does not report any vaginal bleeding or uterine cramping or contractions.      Specific counseling was provided on the following problems:  We discussed the options for genetic screening which include invasive testing on the fetal placenta or on fetal skin cells within the amniotic fluid and compared this to noninvasive testing which includes cell free DNA screening.  We reviewed the risks, the benefits and the limitations of each.  In the end patient chose to complete the cell free DNA screen.     A single LEEP does not appear to increase the risk of   mortality or  delivery at < or = to 34 weeks. The highest quality data, a meta-analysis of 20 observational studies, found no increased risk. On the other hand, it appears that there is an increased risk of late  delivery (after 34 weeks) in women who have undergone LEEP. The depth of excision ( > 10mm) appears to influence the risk of  delivery.  Some studies show an association with dysplasia of the cervix and  labor.  Hence it is reassuring that her Pap smears have been normal.  We also discussed the risk of a LEEP and causing cervical scarring that might need released at the time of labor.   History of retinal detachment in the past.  I asked that she speak with her eye doctor to make sure there is no special need for exams during pregnancy.         Future tests recommended:  The results of her NIPT will return in 7-10 days.  Screening for spina bifida with an MSAFP screen is a future test that can be prescribed through her OB office.  This blood work should be drawn preferably at 16 to 18 weeks so that the results return prior to her next scan.  The test though can be run until 21 weeks and 6 days if needed.    Future ultrasounds ordered today:   Fetal Level II ultrasound imaging is recommended at 19-20 weeks' gestation.    Pre visit time reviewing her records   5 minutes  Face to face time 15 minutes  Post visit time on documentation of note, updating her problem list, adding orders and prescriptions 15 minutes.  Procedures that were completed today were charged separately.   The level of decision making was low level complexity.    Priscila Jaime MD

## 2025-01-24 NOTE — LETTER
January 25, 2025     Cathy Caraballo PA-C  [address]  [address]  [place] [address]    Patient: Melvi Morrison   YOB: 1990   Date of Visit: 1/24/2025       Dear Dr. Caraballo:    Thank you for referring Melvi Morrison to me for evaluation. Below are my notes for this consultation.    If you have questions, please do not hesitate to call me. I look forward to following your patient along with you.         Sincerely,        Priscila Jaime MD        CC: No Recipients    Nya Portillo  1/24/2025  2:38 PM  Sign when Signing Visit  Patient chose to have [facility] [id number] Non-Invasive Prenatal Screen [id number] PLUS w/ SCA, WITH fetal sex.  Patient choose to be billed through insurance.     Patient given brochure and is aware [facility] will contact patient's insurance and coordinate coverage.  Provided [facility] contact information. General inquiries [phone], Cost estimates [phone] and [facility] Billing [phone]. Website [email or web address].     Blood collection tubes labeled with patient identifiers (name, medical record number, and date of birth).     Filled out [facility] order form. Patient chose to have blood drawn in our office at time of visit. NIPS was drawn from left arm with a butterfly needle by KRZYSZTOF Portillo. .      If patient chose to have blood work drawn at a [facility] lab we requested patient notify MFM (via phone call or [email or web address] message) when blood collected so office can follow up on results.       M[facility]etal Medicine will have results in approximately 5-7 business days and will call patient or notify via [email or web address].  Patient aware viewing lab result online will reveal fetal sex if ordered.    Patient verbalized understanding of all instructions and no questions at this time.      Priscila Jaime MD  1/25/2025  8:46 AM  Sign when Signing Visit  OFFICE CONSULT  Referring physician:   Cathy Caraballo Pa-c  [address]  [address]  [place] [address]      Dear  Cathy Caraballo      Thank you for requesting a  consultation on your patient Ms. 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  She reports normal Pap smears since  Past surgical history: LEEP and history of a retinal detachment repair with scleral buckle on the right and tonsillectomy  Past obstetrical history:  1 para 0  Social history: Reports no substance use  First generation family history: None reported    Ultrasound findings:  The ultrasound shows a fetus concordant with dates. The nasal bone and nuchal translucency appears normal. No malformations are seen on today's early ultrasound.  The fetal chin view and three-vessel cord view were limited secondary to fetal position.  Using color Doppler we were able to see 2 blood vessels in the cord [id number] that were running in the same direction suggesting that there must be a three-vessel cord.     The patient was informed of the findings and counseled about the limitations of the exam in detecting all forms of fetal congenital abnormalities.    She does not report any vaginal bleeding or uterine cramping or contractions.      Specific counseling was provided on the following problems:  We discussed the options for genetic screening which include invasive testing on the fetal placenta or on fetal skin cells within the amniotic fluid and compared this to noninvasive testing which includes cell free DNA screening.  We reviewed the risks, the benefits and the limitations of each.  In the end patient chose to complete the cell free DNA screen.     A single LEEP does not appear to increase the risk of  mortality or  delivery at < or = to 34 weeks. The highest quality data, a meta-analysis of 20 observational  studies, found no increased risk. On the other hand, it appears that there is an increased risk of late  delivery (after 34 weeks) in women who have undergone LEEP. The depth of excision ( > 10mm) appears to influence the risk of  delivery.  Some studies show an association with dysplasia of the cervix and  labor.  Hence it is reassuring that her Pap smears have been normal.  We also discussed the risk of a LEEP and causing cervical scarring that might need released at the time of labor.   History of retinal detachment in the past.  I asked that she speak with her eye doctor to make sure there is no special need for exams during pregnancy.         Future tests recommended:  The results of her NIPT will return in 7-10 days.  Screening for spina bifida with an MSAFP screen is a future test that can be prescribed through her OB office.  This blood work should be drawn preferably at 16 to 18 weeks so that the results return prior to her next scan.  The test though can be run until 21 weeks and 6 days if needed.    Future ultrasounds ordered today:   Fetal Level II ultrasound imaging is recommended at 19-20 weeks' gestation.    Pre visit time reviewing her records   5 minutes  Face to face time 15 minutes  Post visit time on documentation of note, updating her problem list, adding orders and prescriptions 15 minutes.  Procedures that were completed today were charged separately.   The level of decision making was low level complexity.    Priscila Jaime MD

## 2025-01-29 ENCOUNTER — RESULTS FOLLOW-UP (OUTPATIENT)
Dept: PERINATAL CARE | Facility: OTHER | Age: 35
End: 2025-01-29

## 2025-01-29 LAB
CFDNA.FET/CFDNA.TOTAL SFR FETUS: NORMAL %
CITATION REF LAB TEST: NORMAL
FET 13+18+21+X+Y ANEUP PLAS.CFDNA: NEGATIVE
FET CHR 21 TS PLAS.CFDNA QL: NEGATIVE
FET CHR 21 TS PLAS.CFDNA QL: NEGATIVE
FET MS X RISK WBC.DNA+CFDNA QL: NOT DETECTED
FET SEX PLAS.CFDNA DOSAGE CFDNA: NORMAL
FET TS 13 RISK PLAS.CFDNA QL: NEGATIVE
FET X + Y ANEUP RISK PLAS.CFDNA SEQ-IMP: NOT DETECTED
GA EST FROM CONCEPTION DATE: NORMAL D
GESTATIONAL AGE > 9:: YES
LAB DIRECTOR NAME PROVIDER: NORMAL
LAB DIRECTOR NAME PROVIDER: NORMAL
LABORATORY COMMENT REPORT: NORMAL
LIMITATIONS OF THE TEST: NORMAL
NEGATIVE PREDICTIVE VALUE: NORMAL
PERFORMANCE CHARACTERISTICS: NORMAL
POSITIVE PREDICTIVE VALUE: NORMAL
REF LAB TEST METHOD: NORMAL
SL AMB NOTE:: NORMAL
TEST PERFORMANCE INFO SPEC: NORMAL

## 2025-01-31 ENCOUNTER — INITIAL PRENATAL (OUTPATIENT)
Dept: OBGYN CLINIC | Facility: CLINIC | Age: 35
End: 2025-01-31

## 2025-01-31 VITALS — BODY MASS INDEX: 30.09 KG/M2 | SYSTOLIC BLOOD PRESSURE: 124 MMHG | WEIGHT: 186.4 LBS | DIASTOLIC BLOOD PRESSURE: 80 MMHG

## 2025-01-31 DIAGNOSIS — Z36.9 ANTENATAL SCREENING ENCOUNTER: ICD-10-CM

## 2025-01-31 DIAGNOSIS — Z3A.13 13 WEEKS GESTATION OF PREGNANCY: Primary | ICD-10-CM

## 2025-01-31 PROCEDURE — 87591 N.GONORRHOEAE DNA AMP PROB: CPT | Performed by: PHYSICIAN ASSISTANT

## 2025-01-31 PROCEDURE — 87491 CHLMYD TRACH DNA AMP PROBE: CPT | Performed by: PHYSICIAN ASSISTANT

## 2025-01-31 PROCEDURE — PNV: Performed by: PHYSICIAN ASSISTANT

## 2025-01-31 NOTE — PROGRESS NOTES
Melvi Morrison is [id number], here for her initial ob appt; GC/c to be collected today, per Tyra, collect pap post-partum. Pt denies any LOF, VB, or CTXs.          UA neg/neg

## 2025-02-03 LAB
C TRACH DNA SPEC QL NAA+PROBE: NEGATIVE
N GONORRHOEA DNA SPEC QL NAA+PROBE: NEGATIVE

## 2025-02-05 ENCOUNTER — RESULTS FOLLOW-UP (OUTPATIENT)
Dept: OBGYN CLINIC | Facility: CLINIC | Age: 35
End: 2025-02-05

## 2025-02-06 NOTE — PROGRESS NOTES
34 y.o.  female at 14w1d (Estimated Date of Delivery: 25) for PNV.    Pre- Vitals      Flowsheet Row Most Recent Value   Prenatal Assessment    Movement Absent   Prenatal Vitals    Blood Pressure 124/80   Weight - Scale 84.6 kg (186 lb 6.4 oz)   Urine Albumin/Glucose    Dilation/Effacement/Station    Vaginal Drainage    Edema           TWG: 3.357 kg (7 lb 6.4 oz)    Leakage of fluid: no  Vaginal bleeding: no  Contractions/Cramping: no  Fetal movement: no  Pt feeling well  Declines pap today--will do at postpartum  Cultures done  materniT neg  Rx afp    RTO in 4 weeks.

## 2025-02-26 PROBLEM — Z3A.17 17 WEEKS GESTATION OF PREGNANCY: Status: ACTIVE | Noted: 2025-01-19

## 2025-02-27 ENCOUNTER — TELEPHONE (OUTPATIENT)
Dept: OBGYN CLINIC | Facility: CLINIC | Age: 35
End: 2025-02-27

## 2025-02-27 NOTE — TELEPHONE ENCOUNTER
Patient was called and discussed the following:    Overall how are you doing? Feeling good    Compliant with routine OB care appointments? YES and has an appointment tomorrow.    Have you completed your 1st trimester labs? Yes    If you had NIPS with MFM, do you have a order for MSAFP? AFP is Alpha-fetoprotein. This is a blood draw that screens for birth defects like spina bifida and other genetic disorders.   The AFP is already ordered. Patient aware she can get this drawn at any [facility] lab.     Can be completed 15w-22w6d, ideally 16w-18w    Have you seen MFM and do you have your detailed US scheduled? 3/27    Pregnancy Education-have you had a chance to review the classes offered and registered? Looking over.    Patient knows to call office for symptoms and reach out to me via portal as well.

## 2025-02-28 ENCOUNTER — ROUTINE PRENATAL (OUTPATIENT)
Dept: OBGYN CLINIC | Facility: CLINIC | Age: 35
End: 2025-02-28

## 2025-02-28 VITALS — WEIGHT: 190.8 LBS | DIASTOLIC BLOOD PRESSURE: 78 MMHG | BODY MASS INDEX: 30.8 KG/M2 | SYSTOLIC BLOOD PRESSURE: 118 MMHG

## 2025-02-28 DIAGNOSIS — O34.40 HISTORY OF LOOP ELECTROSURGICAL EXCISION PROCEDURE (LEEP) OF CERVIX AFFECTING PREGNANCY, ANTEPARTUM: Primary | ICD-10-CM

## 2025-02-28 DIAGNOSIS — Z98.890 HISTORY OF LOOP ELECTROSURGICAL EXCISION PROCEDURE (LEEP) OF CERVIX AFFECTING PREGNANCY, ANTEPARTUM: Primary | ICD-10-CM

## 2025-02-28 DIAGNOSIS — Z3A.17 17 WEEKS GESTATION OF PREGNANCY: ICD-10-CM

## 2025-02-28 PROCEDURE — PNV: Performed by: OBSTETRICS & GYNECOLOGY

## 2025-02-28 NOTE — PROGRESS NOTES
34 y.o.  female at 17w2d (Estimated Date of Delivery: 25) for PNV.    Pre- Vitals      Flowsheet Row Most Recent Value   Prenatal Assessment    Fetal Heart Rate 160   Movement Present   Prenatal Vitals    Blood Pressure 118/78   Weight - Scale 86.5 kg (190 lb 12.8 oz)   Urine Albumin/Glucose    Dilation/Effacement/Station    Vaginal Drainage    Edema           TW.352 kg (11 lb 12.8 oz)    Leakage of fluid: no  Vaginal bleeding: no  Contractions/Cramping: no  Fetal movement: yes, flutters    Level II US scheduled  Reminded to complete AFP  Urine neg/neg    RTO in 4 weeks.

## 2025-03-19 ENCOUNTER — RESULTS FOLLOW-UP (OUTPATIENT)
Dept: OBGYN CLINIC | Facility: CLINIC | Age: 35
End: 2025-03-19

## 2025-03-19 LAB
# FETUSES US: 1
AFP ADJ MOM SERPL: 1.7
AFP INTERP SERPL-IMP: NORMAL
AFP SERPL-MCNC: 82.7 NG/ML
AGE: NORMAL
DONATED EGG PATIENT QL: NO
GA CLIN EST: 19.7 WEEKS
GA METHOD: NORMAL
HX OF NTD NARR: NO
HX OF TRISOMY 21 NARR: NO
IDDM PATIENT QL: NO
NEURAL TUBE DEFECT RISK FETUS: NORMAL %
SL AMB REPEAT SPECIMEN: NO

## 2025-03-24 ENCOUNTER — ROUTINE PRENATAL (OUTPATIENT)
Dept: OBGYN CLINIC | Facility: CLINIC | Age: 35
End: 2025-03-24

## 2025-03-24 VITALS — DIASTOLIC BLOOD PRESSURE: 70 MMHG | WEIGHT: 191 LBS | SYSTOLIC BLOOD PRESSURE: 122 MMHG | BODY MASS INDEX: 30.83 KG/M2

## 2025-03-24 DIAGNOSIS — R87.613 HGSIL (HIGH GRADE SQUAMOUS INTRAEPITHELIAL LESION) ON PAP SMEAR OF CERVIX: ICD-10-CM

## 2025-03-24 DIAGNOSIS — O34.40 HISTORY OF LOOP ELECTROSURGICAL EXCISION PROCEDURE (LEEP) OF CERVIX AFFECTING PREGNANCY, ANTEPARTUM: Primary | ICD-10-CM

## 2025-03-24 DIAGNOSIS — Z98.890 HISTORY OF LOOP ELECTROSURGICAL EXCISION PROCEDURE (LEEP) OF CERVIX AFFECTING PREGNANCY, ANTEPARTUM: Primary | ICD-10-CM

## 2025-03-24 DIAGNOSIS — Z3A.20 20 WEEKS GESTATION OF PREGNANCY: ICD-10-CM

## 2025-03-24 PROCEDURE — PNV: Performed by: OBSTETRICS & GYNECOLOGY

## 2025-03-24 NOTE — PROGRESS NOTES
34 y.o.   female at 20.5 wga for PNV. BP : 122/70. TW  + FM, - LOF, - Ctxn, - bleeding  Feeling well  C/o heart burn - recommend Pepcid  Level 2 US at the end of the week  Reviewed PTL precautions   F/u 4 weeks

## 2025-03-27 ENCOUNTER — ROUTINE PRENATAL (OUTPATIENT)
Facility: HOSPITAL | Age: 35
End: 2025-03-27
Payer: COMMERCIAL

## 2025-03-27 VITALS
HEIGHT: 66 IN | DIASTOLIC BLOOD PRESSURE: 60 MMHG | BODY MASS INDEX: 31.27 KG/M2 | HEART RATE: 133 BPM | SYSTOLIC BLOOD PRESSURE: 114 MMHG | WEIGHT: 194.6 LBS

## 2025-03-27 DIAGNOSIS — Z36.86 ENCOUNTER FOR ANTENATAL SCREENING FOR CERVICAL LENGTH: ICD-10-CM

## 2025-03-27 DIAGNOSIS — Z36.3 ENCOUNTER FOR ANTENATAL SCREENING FOR MALFORMATIONS: ICD-10-CM

## 2025-03-27 DIAGNOSIS — O34.40 HISTORY OF LOOP ELECTROSURGICAL EXCISION PROCEDURE (LEEP) OF CERVIX AFFECTING PREGNANCY, ANTEPARTUM: ICD-10-CM

## 2025-03-27 DIAGNOSIS — Z3A.21 21 WEEKS GESTATION OF PREGNANCY: Primary | ICD-10-CM

## 2025-03-27 DIAGNOSIS — Z98.890 HISTORY OF LOOP ELECTROSURGICAL EXCISION PROCEDURE (LEEP) OF CERVIX AFFECTING PREGNANCY, ANTEPARTUM: ICD-10-CM

## 2025-03-27 PROCEDURE — 76817 TRANSVAGINAL US OBSTETRIC: CPT | Performed by: OBSTETRICS & GYNECOLOGY

## 2025-03-27 PROCEDURE — 76805 OB US >/= 14 WKS SNGL FETUS: CPT | Performed by: OBSTETRICS & GYNECOLOGY

## 2025-03-27 PROCEDURE — 99212 OFFICE O/P EST SF 10 MIN: CPT | Performed by: OBSTETRICS & GYNECOLOGY

## 2025-03-27 NOTE — PROGRESS NOTES
"[facility]: Melvi was seen today for anatomic survey and cervical length screening ultrasound.  See ultrasound report under \"OB Procedures\" tab.       -----------------------------------------    The time spent on this established patient on the encounter date included 5 minutes previsit service time reviewing records and precharting, 5 minutes face-to-face service time counseling regarding results and coordinating care, and  4 minutes charting, totalling 14 minutes.  Please don't hesitate to contact our office with any concerns or questions.  -Marti Grewal MD  "

## 2025-04-21 ENCOUNTER — ROUTINE PRENATAL (OUTPATIENT)
Dept: OBGYN CLINIC | Facility: CLINIC | Age: 35
End: 2025-04-21

## 2025-04-21 VITALS — BODY MASS INDEX: 31.89 KG/M2 | DIASTOLIC BLOOD PRESSURE: 68 MMHG | SYSTOLIC BLOOD PRESSURE: 112 MMHG | WEIGHT: 197.6 LBS

## 2025-04-21 DIAGNOSIS — Z98.890 HISTORY OF LOOP ELECTROSURGICAL EXCISION PROCEDURE (LEEP) OF CERVIX AFFECTING PREGNANCY, ANTEPARTUM: Primary | ICD-10-CM

## 2025-04-21 DIAGNOSIS — O34.40 HISTORY OF LOOP ELECTROSURGICAL EXCISION PROCEDURE (LEEP) OF CERVIX AFFECTING PREGNANCY, ANTEPARTUM: Primary | ICD-10-CM

## 2025-04-21 DIAGNOSIS — R87.613 HGSIL (HIGH GRADE SQUAMOUS INTRAEPITHELIAL LESION) ON PAP SMEAR OF CERVIX: ICD-10-CM

## 2025-04-21 DIAGNOSIS — Z3A.24 24 WEEKS GESTATION OF PREGNANCY: ICD-10-CM

## 2025-04-21 PROCEDURE — PNV: Performed by: PHYSICIAN ASSISTANT

## 2025-04-21 NOTE — PROGRESS NOTES
Patient is a 33 YO  female presenting to the office at 24.5 weeks for routine OB care.   BP: 112/68  TWlb  Fetal Movement: yes   LOF: no  VB: no  CTX: no  28 wk labs ordered  Reviewed precautions  Call for concerns  RTO 2-3 weeks

## 2025-05-09 ENCOUNTER — RESULTS FOLLOW-UP (OUTPATIENT)
Dept: OBGYN CLINIC | Facility: CLINIC | Age: 35
End: 2025-05-09

## 2025-05-09 DIAGNOSIS — R73.09 ABNORMAL GLUCOSE: Primary | ICD-10-CM

## 2025-05-09 DIAGNOSIS — R73.09 ABNORMAL GLUCOSE TOLERANCE TEST: ICD-10-CM

## 2025-05-09 LAB
ERYTHROCYTE [DISTWIDTH] IN BLOOD BY AUTOMATED COUNT: 12 % (ref 11–15)
GLUCOSE 1H P 50 G GLC PO SERPL-MCNC: 136 MG/DL
HCT VFR BLD AUTO: 37.2 % (ref 35–45)
HGB BLD-MCNC: 12.7 G/DL (ref 11.7–15.5)
MCH RBC QN AUTO: 30.9 PG (ref 27–33)
MCHC RBC AUTO-ENTMCNC: 34.1 G/DL (ref 32–36)
MCV RBC AUTO: 90.5 FL (ref 80–100)
PLATELET # BLD AUTO: 295 THOUSAND/UL (ref 140–400)
PMV BLD REES-ECKER: 9.8 FL (ref 7.5–12.5)
RBC # BLD AUTO: 4.11 MILLION/UL (ref 3.8–5.1)
RPR SER QL: NORMAL
WBC # BLD AUTO: 10.7 THOUSAND/UL (ref 3.8–10.8)

## 2025-05-09 NOTE — TELEPHONE ENCOUNTER
Pt called in regarding her 1 hour GTT test results/mychart message. Pt wondering how urgently she needs to have the 3 hour GTT done/if she can go next week. Advised going next week would be okay, typically would like it done within 2 weeks. Pt also wondering how long she needs to fast for. Advised 8-10 hours overnight and going to the lab first thing in the morning. Pt states she uses [facility], advised to make appointment if needed. Pt verbalized understanding and has no further questions.

## 2025-05-12 ENCOUNTER — ROUTINE PRENATAL (OUTPATIENT)
Dept: OBGYN CLINIC | Facility: CLINIC | Age: 35
End: 2025-05-12

## 2025-05-12 VITALS — WEIGHT: 200.8 LBS | BODY MASS INDEX: 32.41 KG/M2 | DIASTOLIC BLOOD PRESSURE: 72 MMHG | SYSTOLIC BLOOD PRESSURE: 112 MMHG

## 2025-05-12 DIAGNOSIS — O34.40 HISTORY OF LOOP ELECTROSURGICAL EXCISION PROCEDURE (LEEP) OF CERVIX AFFECTING PREGNANCY, ANTEPARTUM: Primary | ICD-10-CM

## 2025-05-12 DIAGNOSIS — Z3A.27 27 WEEKS GESTATION OF PREGNANCY: ICD-10-CM

## 2025-05-12 DIAGNOSIS — Z98.890 HISTORY OF LOOP ELECTROSURGICAL EXCISION PROCEDURE (LEEP) OF CERVIX AFFECTING PREGNANCY, ANTEPARTUM: Primary | ICD-10-CM

## 2025-05-12 DIAGNOSIS — R73.09 ABNORMAL GLUCOSE TOLERANCE TEST (GTT): ICD-10-CM

## 2025-05-12 PROCEDURE — PNV: Performed by: PHYSICIAN ASSISTANT

## 2025-05-12 NOTE — PROGRESS NOTES
Urine neg/neg  No vag bleeding/lof  No cramping/ctx   +FM  Would like to discuss glucose test done  Breast pump ordered

## 2025-05-12 NOTE — PROGRESS NOTES
34 y.o.  female at 27w5d (Estimated Date of Delivery: 25) for PNV.    Pre- Vitals      Flowsheet Row Most Recent Value   Prenatal Assessment    Movement Present   Prenatal Vitals    Blood Pressure 112/72   Weight - Scale 91.1 kg (200 lb 12.8 oz)   Urine Albumin/Glucose    Dilation/Effacement/Station    Vaginal Drainage    Edema           TW.888 kg (21 lb 12.8 oz)    Leakage of fluid: no  Vaginal bleeding: no  Contractions/Cramping: no  Fetal movement: yes  Pt is feeling well overall  Failed 1 hour glucola by 1 point--did fast  Rx given for 3 hour and discussed in detail  Pump ordered  Tdap declined until next visit  Red folder reviewed--consent signed    RTO in 2 weeks.

## 2025-05-16 LAB
DME PARACHUTE DELIVERY DATE ACTUAL: NORMAL
DME PARACHUTE DELIVERY DATE REQUESTED: NORMAL
DME PARACHUTE ITEM DESCRIPTION: NORMAL
DME PARACHUTE ORDER STATUS: NORMAL
DME PARACHUTE SUPPLIER NAME: NORMAL
DME PARACHUTE SUPPLIER PHONE: NORMAL

## 2025-05-19 LAB
GLUCOSE 1H P CHAL SERPL-MCNC: 148 MG/DL
GLUCOSE 2H P CHAL SERPL-MCNC: 123 MG/DL
GLUCOSE 3H P 100 G GLC PO SERPL-MCNC: 91 MG/DL
GLUCOSE P FAST SERPL-MCNC: 77 MG/DL (ref 65–94)

## 2025-05-20 ENCOUNTER — RESULTS FOLLOW-UP (OUTPATIENT)
Dept: OBGYN CLINIC | Facility: CLINIC | Age: 35
End: 2025-05-20

## 2025-05-30 ENCOUNTER — ROUTINE PRENATAL (OUTPATIENT)
Dept: OBGYN CLINIC | Facility: CLINIC | Age: 35
End: 2025-05-30

## 2025-05-30 VITALS — BODY MASS INDEX: 32.93 KG/M2 | WEIGHT: 204 LBS | DIASTOLIC BLOOD PRESSURE: 72 MMHG | SYSTOLIC BLOOD PRESSURE: 118 MMHG

## 2025-05-30 DIAGNOSIS — Z3A.30 30 WEEKS GESTATION OF PREGNANCY: Primary | ICD-10-CM

## 2025-05-30 DIAGNOSIS — Z98.890 HISTORY OF LOOP ELECTROSURGICAL EXCISION PROCEDURE (LEEP) OF CERVIX AFFECTING PREGNANCY, ANTEPARTUM: ICD-10-CM

## 2025-05-30 DIAGNOSIS — O34.40 HISTORY OF LOOP ELECTROSURGICAL EXCISION PROCEDURE (LEEP) OF CERVIX AFFECTING PREGNANCY, ANTEPARTUM: ICD-10-CM

## 2025-05-30 PROCEDURE — PNV: Performed by: OBSTETRICS & GYNECOLOGY

## 2025-05-30 NOTE — PATIENT INSTRUCTIONS
Stretches for Sciatic Pain    Piriformis syndrome is a problem with one of hte muscles in the buttocks. It is a common cause of sciatic pain during pregnancy. Sometimes the position of your baby can increase pressure on your sciatic nerve and cause pain. Also, your baby's weight can add extra pressure on your pelvis and hip joints causing joint pain. These special exercises can help relieve the pain. Your ligaments are more relaxed during pregnancy, so be gentle with yourself and easy does it!     Seated Piriformis Stretch    Sit on a chair with your feet flat on the ground. If your left side is affected, put your left ankle on your right knee.     Keeping a straight back, lean forward until you feel a stretch through your buttocks.     Hold for 30 seconds. Repeat throughout the day.     Table Stretch    Stand facing a table with your feet slightly wider that your hips. Lean forward with your hands on the table. Keep your arms straight and your back flat.     Pull your hips away from the table until you feel a nice stretch in the lower back and back of your legs. You can also move your hips side to side to increase the stretch in the lower back and hips.     Hold position for 30 seconds to 1 minute. Repeat twice a day.     Bed Stretch    Lie at the edge of the bed on your back, with the affected side on the edge.     Drop the affected leg down off the bed to stretch the ligaments.     Hold for 30-60 seconds. Repeat as needed.     [place]inton Pose    Get on your hands and knees on the floor. Slide your right knee forward so it's between your hands.     Slide your left leg back, keeping your foot on the floor.     Place a rolled towel or a yoga block under your right hip. This will make the stretch easier and allow room for your belly.     Lean forward slowly over your right leg. Slowly lower yourself toward the ground, putting a pillow under your head and arms for support.     Hold for one minute. Repeat on other side.  Repeat a few times throughout the day.     Hip Flexor Stretch    Kneel on the floor on your hands and knees.     Step one foot in front of you so that your hip and knee are at a 90-degree angle.     Shift your weight forward until you feel a stretch in the front of your back, hip and leg.     Hold for 30 seconds, then repeat on the other side.     Glute and Hamstring Foam Rolling    Place a foam roller on the ground.     Sit on the foam roller, supporting yourself with your hands behind you.     Cross one foot over the other knee into a figure 4 position.     Slowly move your body back and forth over the foam roller until you find a tender spot.     Continue this movement over the sore area for 30-60 seconds.     Slowly move over the foam roller until you find another tender area. Continue over the area for 30-60 seconds.     Repeat on the other side.     Prenatal Yoga    Prenatal yoga can be an effective way to stretch muscles while focusing on breathing. Performing yoga could help relieve discomfort such as sciatic pain. Take a class that focuses on stretching, strengthening and breathing. You'll not only help prepare your body for labor and delivery, but you can reduce many common pregnancy discomforts.                       [email or web address]    Physical Therapy at [facility]’s team of trained female therapists offer a wide variety of services designed to address the special healthcare needs of women. Our specially trained clinicians work with you to address your concerns and come beside you to support and encourage you through the healing process. Our offices are designed to keep your sensitive treatments private at all times. We are here to ensure your comfort and mentor you through our pelvic floor therapy programs at your pace.    Our female team of experts treat the following conditions faced by women:      Urinary or bowel incontinence  Urinary  urgency or frequency  Painful Napoleon  Painful Bladder Syndrome  Overactive Bladder  Constipation  Pelvic Girdle Pain             Sacroiliac Dysfunction   Gynecological Cancers    Pregnancy & Postpartum related discomfort  Buttock/Tailbone Pain                       Lumbar/Thoracic Pain  Hip Abnormalities/Pain    Scar Adhesions  Diastasis Recti  Osteoporosis          /Episiotomy Recovery  Vulvodynia  Pelvic Pain

## 2025-05-30 NOTE — PROGRESS NOTES
3rd Trimester Visit  Name: Melvi Morrison      : 1990      MRN: [id number]  Encounter Provider: Rosa Shaffer DO  Encounter Date: 2025   Encounter department: [facility]    34 y.o.  at 30w2d presenting for routine OB visit at 30w2d.:  Assessment & Plan  30 weeks gestation of pregnancy         History of loop electrosurgical excision procedure (LEEP) of cervix affecting pregnancy, antepartum             Last ultrasound 3/27/2025.  Reviewed premature labor precautions and fetal kick counts.  Advised to continue medications and return in 2 weeks.    History of Present Illness     She reports she would like her tdap today.  Denies uterine contractions.  Denies vaginal bleeding or leaking of fluid.  Reports adequate fetal movement of at least 10 movements in 2 hours once daily.  One gush of fluid after she got out of the shower.   Increased urinary frequency, no dysuria.   Sciatica on her right side.     Medical History Reviewed by provider this encounter:  Tobacco  Allergies  Meds  Problems  Med Hx  Surg Hx  Fam Hx     .     Current Outpatient Medications   Medication Instructions    Prenatal MV-Min-Fe Fum-FA-DHA (PRENATAL 1 PO) 1 tablet, Daily     Objective   /72   Wt 92.5 kg (204 lb)   LMP 10/30/2024 (Exact Date)   BMI 32.93 kg/m²      BP: Blood Pressure: 118/45771/72  Wt: 92.5 kg (204 lb); Body mass index is 32.93 kg/m².; TWG=11.3 kg (25 lb)  Fetal Heart Rate: 140; Fundal Height (cm): 30 cm    Abdomen: gravid, non tender      Pregnancy Problems (from 24 to present)       Problem Noted Diagnosed Resolved    20 weeks gestation of pregnancy 2025 by DREW Rosales  No          
Immediate Post OP Note    PreOp Diagnosis: Anemia      PostOp Diagnosis: 1) Non-bleeding gastric and duodenal ulcers (clip in place still in DU)      Procedure(s):  ESOPHAGOGASTRODUOENDOSCOPY - Wound Class: None    Surgeon(s):  Vasile Negron M.D.    Anesthesiologist/Type of Anesthesia:  Anesthesiologist: Felicity Babin M.D./General    Surgical Staff:  Circulator: Kamlesh Patricia R.N.  Endoscopy Technician: Amber Harris; Beni Clements; Anel Zuniga    Specimens removed if any:  * No specimens in log *    Estimated Blood Loss: None    Findings: 1) Gastric low risk ulcer with gastritis  2) Duodenal ulcer - No active bleeding or oozing. Endoclip in place  3) Distal esophageal non-obstructive stricture    Complications: none    Will advance diet. Can switch to PO PPI and consider for d/c in AM    EMO        11/3/2022 11:21 AM Vasile Negron M.D.  
Urine dip is neg/neg  
30.6

## 2025-06-05 ENCOUNTER — HOSPITAL ENCOUNTER (EMERGENCY)
Facility: HOSPITAL | Age: 35
Discharge: HOME | End: 2025-06-05
Attending: EMERGENCY MEDICINE
Payer: COMMERCIAL

## 2025-06-05 ENCOUNTER — TELEPHONE (OUTPATIENT)
Age: 35
End: 2025-06-05

## 2025-06-05 VITALS
RESPIRATION RATE: 18 BRPM | OXYGEN SATURATION: 98 % | DIASTOLIC BLOOD PRESSURE: 79 MMHG | TEMPERATURE: 98.2 F | HEART RATE: 111 BPM | SYSTOLIC BLOOD PRESSURE: 130 MMHG
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
## 2025-06-05 PROCEDURE — 99282 EMERGENCY DEPT VISIT SF MDM: CPT

## 2025-06-05 PROCEDURE — 99284 EMERGENCY DEPT VISIT MOD MDM: CPT | Performed by: EMERGENCY MEDICINE

## 2025-06-05 RX ORDER — AZITHROMYCIN 250 MG/1
500 TABLET, FILM COATED ORAL ONCE
Status: COMPLETED | OUTPATIENT
Start: 2025-06-05 | End: 2025-06-05

## 2025-06-05 RX ORDER — AZITHROMYCIN 250 MG/1
250 TABLET, FILM COATED ORAL DAILY
Qty: 4 TABLET | Refills: 0 | Status: SHIPPED | OUTPATIENT
Start: 2025-06-05 | End: 2025-06-09

## 2025-06-05 RX ORDER — AZITHROMYCIN 250 MG/1
TABLET, FILM COATED ORAL
Qty: 4 TABLET | Refills: 0 | Status: CANCELLED | OUTPATIENT
Start: 2025-06-06 | End: 2025-06-09

## 2025-06-05 RX ORDER — GINSENG 100 MG
1 CAPSULE ORAL ONCE
Status: COMPLETED | OUTPATIENT
Start: 2025-06-05 | End: 2025-06-05

## 2025-06-05 RX ADMIN — BACITRACIN ZINC 1 SMALL APPLICATION: 500 OINTMENT TOPICAL at 08:09

## 2025-06-05 RX ADMIN — AZITHROMYCIN DIHYDRATE 500 MG: 250 TABLET ORAL at 08:09

## 2025-06-05 NOTE — DISCHARGE INSTRUCTIONS
You were evaluated for a superficial laceration and scratches on your face due to a cat. The wound was cleaned and bandages were placed. Your immunizations of rabies and tetanus are uptodate. We prescribed you azithromycin 500 mg and bacitracin ointment was placed. From tomorrow, START taking azithromycin 250 mg for 4 days. Wash the affected areas with soap and water gently and apply mupiricin in the morning. Please return to the ED if you experience fever, redness, warmth, itching, oozing/discharge at the cat scratch sites.

## 2025-06-05 NOTE — TELEPHONE ENCOUNTER
Spoke with patient who is 31w1d, .  She was seen in the ED today for a cat scratch on her face.  She was prescribed Azithromycin and would like to confirm that is safe to take.

## 2025-06-05 NOTE — TELEPHONE ENCOUNTER
Outgoing call to patient, no answer. Left message (ok per communication form) informing patient that Azithromycin is safe to take in pregnancy per Dr. Stevenson. Patient to call back with any follow-up questions.

## 2025-06-05 NOTE — ED PROVIDER NOTES
Time reflects when diagnosis was documented in both MDM as applicable and the Disposition within this note       Time User Action Codes Description Comment    6/5/2025  8:05 AM Brianna Frazier Add [W55.03XA] Cat scratch           ED Disposition       ED Disposition   Discharge    Condition   Stable    Date/Time   Thu Jun 5, 2025  8:06 AM    Comment   Melvi Morrison discharge to home/self care.                   Assessment & Plan       Medical Decision Making  Risk  OTC drugs.  Prescription drug management.    Patient presents with superficial laceration and scratch marks from her cat.  The wound areas were cleaned, bandaged, Bactrim ointment applied.  Patient does have a history of right retinal detachment however denies any eye pain and there are no visual disturbances or cranial nerve defects on exam.  Afebrile on admission patient was given 500 mg of Zithromax in the ED.  Patient will be discharged with 4 doses of Zithromax 250 mg tablet.  Instructed to wash face and apply mupirocin ointment once daily.           Medications   azithromycin (ZITHROMAX) tablet 500 mg (500 mg Oral Given 6/5/25 0809)   bacitracin topical ointment 1 small application (1 small application Topical Given by Other 6/5/25 0809)       ED Risk Strat Scores                    No data recorded                            History of Present Illness       Chief Complaint   Patient presents with    Cat Scratch     Pt presents with cat scratches to face. Pt states it was her cat who got spooked and jumped on her       Past Medical History[1]   Past Surgical History[2]   Family History[3]   Social History[4]   E-Cigarette/Vaping    E-Cigarette Use Never User       E-Cigarette/Vaping Substances    Nicotine No     THC No     CBD No     Flavoring No     Other No     Unknown No       I have reviewed and agree with the history as documented.     HPI    Patient is a 33 year old female with a history of pregnancy 31 weeks and right eye retinal detachment  presents with cat scratch and laceration of the right eyelid.  Patient woke up at 6 AM when patient's On Her and Scratched Her Face.  Has 4 Cats and She Is Vaccinated against Rabies and Tetanus.  She Does Have a History of Being in Contact with Feral Cats in the past.  Last Rabies Series Was Last Year after a Feral Cat Scratch.  Patient Tried Cleaning the Sites with Mary Soap, Water, Antibacterial Soap, Hydrogen Peroxide.  She Denied Fever, Itchiness at the Site, Headache, Changes in Vision.  Vitals on admission blood pressure 130/79 afebrile heart rate 110 respiratory rate 18 SpO2 98%.   Review of Systems   Constitutional:  Negative for activity change, appetite change, chills and fever.   HENT:  Negative for facial swelling, nosebleeds and postnasal drip.    Eyes:  Negative for photophobia, pain, discharge, redness, itching and visual disturbance.   Respiratory:  Negative for shortness of breath.    Gastrointestinal:  Negative for diarrhea, nausea and vomiting.   Skin:  Positive for wound. Negative for color change and rash.   Allergic/Immunologic:        Allergic to ciprofloxacin, sulfonylureas    Neurological:  Negative for tremors, seizures, syncope, facial asymmetry, light-headedness, numbness and headaches.   Psychiatric/Behavioral: Negative.  Negative for agitation, behavioral problems, confusion, decreased concentration, dysphoric mood, hallucinations, self-injury, sleep disturbance and suicidal ideas. The patient is not nervous/anxious and is not hyperactive.            Objective       ED Triage Vitals [06/05/25 0735]   Temperature Pulse Blood Pressure Respirations SpO2 Patient Position - Orthostatic VS   98.2 °F (36.8 °C) (!) 111 130/79 18 98 % --      Temp Source Heart Rate Source BP Location FiO2 (%) Pain Score    Oral Monitor Right arm -- --      Vitals      Date and Time Temp Pulse SpO2 Resp BP Pain Score FACES Pain Rating User   06/05/25 0735 98.2 °F (36.8 °C) 111 98 % 18 130/79 -- --              Physical Exam  Vitals and nursing note reviewed.   Constitutional:       General: She is not in acute distress.     Appearance: She is well-developed.   HENT:      Head: Normocephalic and atraumatic.      Comments: Superficial laceration of right eyelid some oozing of blood with scratches throughout face.    Eyes:      Extraocular Movements: Extraocular movements intact.      Conjunctiva/sclera: Conjunctivae normal.      Pupils: Pupils are equal, round, and reactive to light.       Cardiovascular:      Rate and Rhythm: Normal rate and regular rhythm.      Heart sounds: No murmur heard.  Pulmonary:      Effort: Pulmonary effort is normal. No respiratory distress.      Breath sounds: Normal breath sounds.   Abdominal:      Palpations: Abdomen is soft.      Tenderness: There is no abdominal tenderness.     Musculoskeletal:         General: No swelling.      Cervical back: Neck supple.     Skin:     General: Skin is warm and dry.      Capillary Refill: Capillary refill takes less than 2 seconds.     Neurological:      General: No focal deficit present.      Mental Status: She is alert and oriented to person, place, and time. Mental status is at baseline.      Cranial Nerves: No cranial nerve deficit.      Sensory: No sensory deficit.      Motor: No weakness.     Psychiatric:         Mood and Affect: Mood normal.         Behavior: Behavior normal.         Thought Content: Thought content normal.         Judgment: Judgment normal.         Results Reviewed       None            No orders to display       Procedures    ED Medication and Procedure Management   Prior to Admission Medications   Prescriptions Last Dose Informant Patient Reported? Taking?   Prenatal MV-Min-Fe Fum-FA-DHA (PRENATAL 1 PO)  Self Yes No   Sig: Take 1 tablet by mouth in the morning      Facility-Administered Medications: None     Discharge Medication List as of 6/5/2025  8:20 AM        START taking these medications    Details   azithromycin  (Zithromax Z-Jose) 250 mg tablet Take 1 tablet (250 mg total) by mouth daily for 4 doses Take two tabs on day one and then one tab each day for 4 days, Starting Thu 6/5/2025, Until Mon 6/9/2025, Normal           CONTINUE these medications which have NOT CHANGED    Details   Prenatal MV-Min-Fe Fum-FA-DHA (PRENATAL 1 PO) Take 1 tablet by mouth in the morning, Historical Med           No discharge procedures on file.  ED SEPSIS DOCUMENTATION   Time reflects when diagnosis was documented in both MDM as applicable and the Disposition within this note       Time User Action Codes Description Comment    6/5/2025  8:05 AM Brianna Frazier Add [W55.03XA] Cat scratch                      [1]   Past Medical History:  Diagnosis Date    Abnormal Pap smear of cervix     Anxiety     HPV in female    [2]   Past Surgical History:  Procedure Laterality Date    CERVICAL BIOPSY  W/ LOOP ELECTRODE EXCISION  10/17/2023    RETINAL DETACHMENT REPAIR W/ SCLERAL BUCKLE LE Right 2020    TONSILLECTOMY     [3]   Family History  Problem Relation Name Age of Onset    No Known Problems Mother      No Known Problems Father      Hodgkin's lymphoma Brother      Hodgkin's lymphoma Maternal Grandfather      No Known Problems Other Donnie    [4]   Social History  Tobacco Use    Smoking status: Never    Smokeless tobacco: Never   Vaping Use    Vaping status: Never Used   Substance Use Topics    Alcohol use: Never    Drug use: Never        Brianna Frazier MD  06/05/25 0984

## 2025-06-08 NOTE — ED ATTENDING ATTESTATION
6/5/2025  IArmando MD, saw and evaluated the patient. I have discussed the patient with the resident/non-physician practitioner and agree with the resident's/non-physician practitioner's findings, Plan of Care, and MDM as documented in the resident's/non-physician practitioner's note, except where noted. All available labs and Radiology studies were reviewed.  I was present for key portions of any procedure(s) performed by the resident/non-physician practitioner and I was immediately available to provide assistance.       At this point I agree with the current assessment done in the Emergency Department.  I have conducted an independent evaluation of this patient a history and physical is as follows:see  h and p above- agree with resident     ED Course  ED Course as of 06/08/25 1630   Thu Jun 05, 2025   0818 Er md wound care note for cat scratch abrasions-laceration on r  upepr eyelid--  pt washed at home with saop and water- peroxide-- er washed with tap water and chlorhexadine wash -- for r upper eyelid superficial laceration --  steri strips made with tape and wound taped together -- -- d/w pt and  that lac is superficial and t present would not suture or glue area do to infection risk - they are aware that eyelid lac will take up to 1 year to fully heal -- also are aware that wound is already infected and hoe is with good wound care  and oral antibx that we can prevent a clinically important infection - in which she would need to return to  the er -- would care instructions explained to both pt and  and reasons when to return to  the er- they verbalize understanding -- note- topical antibiotic applied over facial abrasions by er md    0866 Er md note- pt seen and thoroughly evaluated by er md- case d/w resident -- 34 yr female approx 31 weeks iup --  accidentally scratched by own cat on face this am --  pt has had recent rabies/td vaccinations  -- pt washed otu at home with soap and  water- peroxide- pt denies any r eye  pain or injury /vision changes-photphobia- avss-  midl tachycardia--  pulse ox 98 % on ra- interpretation is normal - no intervention- face- linear horizontal abrasion across nose- and right lower forehead--  and right  body of mandible-- - r upper eyelid-- superficial linear  2 cn horizontal laceration -- normal eyelid movement--  r eye- perrla- eomi-           Critical Care Time  Procedures

## 2025-06-09 ENCOUNTER — ROUTINE PRENATAL (OUTPATIENT)
Dept: OBGYN CLINIC | Facility: CLINIC | Age: 35
End: 2025-06-09

## 2025-06-09 VITALS — BODY MASS INDEX: 33.25 KG/M2 | DIASTOLIC BLOOD PRESSURE: 72 MMHG | WEIGHT: 206 LBS | SYSTOLIC BLOOD PRESSURE: 114 MMHG

## 2025-06-09 DIAGNOSIS — O34.40 HISTORY OF LOOP ELECTROSURGICAL EXCISION PROCEDURE (LEEP) OF CERVIX AFFECTING PREGNANCY, ANTEPARTUM: Primary | ICD-10-CM
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
## 2025-06-09 DIAGNOSIS — S00.81XD CAT SCRATCH OF FACE, SUBSEQUENT ENCOUNTER: ICD-10-CM

## 2025-06-09 DIAGNOSIS — Z98.890 HISTORY OF LOOP ELECTROSURGICAL EXCISION PROCEDURE (LEEP) OF CERVIX AFFECTING PREGNANCY, ANTEPARTUM: Primary | ICD-10-CM

## 2025-06-09 DIAGNOSIS — Z3A.31 31 WEEKS GESTATION OF PREGNANCY: ICD-10-CM

## 2025-06-09 PROCEDURE — PNV

## 2025-06-09 NOTE — PROGRESS NOTES
Patient is a 33 YO  female presenting to the office at 31w5d for routine OB care.   Patient is feeling well today.   She was seen in the ED 25 for a cat scratch of her face. It was her own cat. Wounds were cleaned and she was started on Zithromax.   She is interested in taking placenta home for donation and we discussed cord blood banking today.   Declined tdap today, may consider next visit   Fetal heart rate: 145  BP: 114/72  TWlb  Fetal Movement: yes, good movement   LOF: no  VB: no  CTX: no  Reviewed precautions  Call for concerns  RTO 2 weeks

## 2025-06-09 NOTE — PROGRESS NOTES
Mlevi Morrison is [id number], here for her routine ob appt; pt denies any LOF, VB, or CTXs.  Pt is to get Tdap this morning, pt has questions.         UA neg/neg

## 2025-06-18 ENCOUNTER — NURSE TRIAGE (OUTPATIENT)
Age: 35
End: 2025-06-18

## 2025-06-18 NOTE — TELEPHONE ENCOUNTER
"REASON FOR CONVERSATION: Itching    SYMPTOMS: itching, intermittent rash on arms and legs     OTHER HEALTH INFORMATION: Patient 33w0d  with intermittent 7/10 itching and rash on thighs, behind knees, ankles, forearms and shoulder area since Saturday. Denies any new soaps/detergents, allergen exposures. Recently completed course of zithromax .     PROTOCOL DISPOSITION: See PCP Within 24 Hours    CARE ADVICE PROVIDED: Avoid soaps/detergents with scents or dyes. Try using sensitive lotion such as lubriderm or vasoline intensive care to moisturize skin. Avoid scratching. May take allergy medication such as claritin, zyrtec, allegra. Return call if symptoms change or worsen.     ESC Dr Stevenson    -- agree with recommendations    PRACTICE FOLLOW-UP: office visit           Reason for Disposition   MODERATE-SEVERE widespread itching (e.g., interferes with sleep, normal activities or school)    Answer Assessment - Initial Assessment Questions  1. DESCRIPTION: \"Describe the itching you are having.\"     Intermittent itching, with raised red bumps that come and go   2. LOCATION: \"Where is the itching located?\" (abdomen, feet, hands, or everywhere equally)       Itching on legs: upper thighs, ankles, behind knees; arms : forearm and beneath shoulder   3. SEVERITY: \"How bad is it?\"       7/10   4. SCRATCHING: \"Are there any scratch marks? Bleeding?\"      Yes 2 or 3 small locations   5. ONSET: \"When did this begin?\"       Saturday   6. CAUSE: \"What do you think is causing the itching?\" (ask about swimming pools, pollen, animals, soaps, etc.)      Unsure; no new soaps or detergents. Finished a zpack    7. OTHER SYMPTOMS: \"Do you have any other symptoms?\" (abdominal pain, dark urine, fever, light colored stool, poor appetite, rash, or weight loss)      Rash with the itching but goes away intermittently   8. PREGNANCY: \"How many weeks pregnant are you?\" \"Are you being monitored for a high-risk pregnancy?\"      33w0d   9. " "ORIANA: \"What date are you expecting to deliver?\"      8/6/25    Protocols used: Pregnancy - Itching-Adult-AH    "

## 2025-06-19 ENCOUNTER — ROUTINE PRENATAL (OUTPATIENT)
Dept: OBGYN CLINIC | Facility: CLINIC | Age: 35
End: 2025-06-19

## 2025-06-19 VITALS — DIASTOLIC BLOOD PRESSURE: 78 MMHG | SYSTOLIC BLOOD PRESSURE: 114 MMHG | WEIGHT: 203 LBS | BODY MASS INDEX: 32.77 KG/M2

## 2025-06-19 DIAGNOSIS — L29.9 PRURITUS OF PREGNANCY IN THIRD TRIMESTER: ICD-10-CM

## 2025-06-19 DIAGNOSIS — O99.713 PRURITUS OF PREGNANCY IN THIRD TRIMESTER: ICD-10-CM

## 2025-06-19 DIAGNOSIS — Z3A.33 33 WEEKS GESTATION OF PREGNANCY: Primary | ICD-10-CM

## 2025-06-19 PROCEDURE — PNV: Performed by: OBSTETRICS & GYNECOLOGY

## 2025-06-19 NOTE — PROGRESS NOTES
35 y.o.  female at 33w1d (Estimated Date of Delivery: 25) for PNV.    Pre- Vitals      Flowsheet Row Most Recent Value   Prenatal Assessment    Fetal Heart Rate 145   Movement Present   Prenatal Vitals    Blood Pressure 114/78   Weight - Scale 92.1 kg (203 lb)   Urine Albumin/Glucose    Dilation/Effacement/Station    Vaginal Drainage    Edema           TWG: 10.9 kg (24 lb)    Leakage of fluid: no  Vaginal bleeding: no  Contractions/Cramping: no  Fetal movement: yes    Pruritus on trunk of body: reviewed using oatmeal lotion, Zyrtec/Benadryl and hydrocortisone lotion. Bile acids ordered, will f/u on results. Pt to notify us with worsening sx.     RTO in 2 weeks.

## 2025-06-23 ENCOUNTER — ROUTINE PRENATAL (OUTPATIENT)
Dept: OBGYN CLINIC | Facility: CLINIC | Age: 35
End: 2025-06-23
Payer: COMMERCIAL

## 2025-06-23 ENCOUNTER — RESULTS FOLLOW-UP (OUTPATIENT)
Dept: OBGYN CLINIC | Facility: CLINIC | Age: 35
End: 2025-06-23

## 2025-06-23 VITALS — WEIGHT: 203 LBS | BODY MASS INDEX: 32.77 KG/M2 | DIASTOLIC BLOOD PRESSURE: 70 MMHG | SYSTOLIC BLOOD PRESSURE: 120 MMHG

## 2025-06-23 DIAGNOSIS — O34.40 HISTORY OF LOOP ELECTROSURGICAL EXCISION PROCEDURE (LEEP) OF CERVIX AFFECTING PREGNANCY, ANTEPARTUM: Primary | ICD-10-CM

## 2025-06-23 DIAGNOSIS — Z3A.33 33 WEEKS GESTATION OF PREGNANCY: ICD-10-CM

## 2025-06-23 DIAGNOSIS — L29.9 PRURITUS OF PREGNANCY IN THIRD TRIMESTER: ICD-10-CM

## 2025-06-23 DIAGNOSIS — O99.713 PRURITUS OF PREGNANCY IN THIRD TRIMESTER: ICD-10-CM

## 2025-06-23 DIAGNOSIS — Z98.890 HISTORY OF LOOP ELECTROSURGICAL EXCISION PROCEDURE (LEEP) OF CERVIX AFFECTING PREGNANCY, ANTEPARTUM: Primary | ICD-10-CM

## 2025-06-23 DIAGNOSIS — Z23 NEED FOR TDAP VACCINATION: ICD-10-CM

## 2025-06-23 LAB — BILE AC SERPL-SCNC: 2 UMOL/L (ref 0–10)

## 2025-06-23 PROCEDURE — 90471 IMMUNIZATION ADMIN: CPT

## 2025-06-23 PROCEDURE — PNV

## 2025-06-23 PROCEDURE — 90715 TDAP VACCINE 7 YRS/> IM: CPT

## 2025-06-23 NOTE — PROGRESS NOTES
Pt is here for her 33w prenatal. Pt denies francisco leakage,bleeding,and cramping.  Pt would like tdap today. Urine dip is neg protein/trace gluc

## 2025-06-23 NOTE — PROGRESS NOTES
3rd Trimester Visit  Name: Melvi Morrison      : 1990      MRN: [id number]  Encounter Provider: Griselda Rosales PA-C  Encounter Date: 2025   Encounter department: [facility]    35 y.o.  at 33w5d presenting for routine OB visit at 33w5d.:  Assessment & Plan  Need for Tdap vaccination    Orders:    TDAP VACCINE GREATER THAN OR EQUAL TO 8YO IM    33 weeks gestation of pregnancy         History of loop electrosurgical excision procedure (LEEP) of cervix affecting pregnancy, antepartum         Pruritus of pregnancy in third trimester         No further US. Vertex presentation on TAUS today.   Reviewed premature labor precautions and fetal kick counts.  Advised to continue medications and return in 2 weeks.    History of Present Illness     She reports intermittent rash/itching, mostly of b/l extremities, but it is not consistent. Bile acids are in process. She uses hydrocortisone PRN which does help. She is otherwise feeling well.   Denies uterine contractions.  Denies vaginal bleeding or leaking of fluid.  Reports adequate fetal movement of at least 10 movements in 2 hours once daily.     Current Outpatient Medications   Medication Instructions    Prenatal MV-Min-Fe Fum-FA-DHA (PRENATAL 1 PO) 1 tablet, Daily     Objective   /70   Wt 92.1 kg (203 lb)   LMP 10/30/2024 (Exact Date)   BMI 32.77 kg/m²      BP: Blood Pressure: 120/70  Wt: 92.1 kg (203 lb); Body mass index is 32.77 kg/m².; TWG=10.9 kg (24 lb)  Fetal Heart Rate: 145;      Abdomen: ”soft”,”non tender”        Pregnancy Problems (from 24 to present)       Problem Noted Diagnosed Resolved    33 weeks gestation of pregnancy 2025 by DREW Rosales  No

## 2025-07-02 ENCOUNTER — NURSE TRIAGE (OUTPATIENT)
Age: 35
End: 2025-07-02

## 2025-07-02 ENCOUNTER — HOSPITAL ENCOUNTER (OUTPATIENT)
Facility: HOSPITAL | Age: 35
Discharge: HOME | End: 2025-07-02
Attending: OBSTETRICS & GYNECOLOGY | Admitting: OBSTETRICS & GYNECOLOGY
Payer: COMMERCIAL

## 2025-07-02 VITALS
WEIGHT: 203 LBS | HEART RATE: 100 BPM | OXYGEN SATURATION: 97 % | BODY MASS INDEX: 32.62 KG/M2 | TEMPERATURE: 97.8 F | RESPIRATION RATE: 18 BRPM | DIASTOLIC BLOOD PRESSURE: 77 MMHG | SYSTOLIC BLOOD PRESSURE: 123 MMHG | HEIGHT: 66 IN

## 2025-07-02 PROBLEM — O36.8190 DECREASED FETAL MOVEMENT AFFECTING MANAGEMENT OF MOTHER, ANTEPARTUM: Status: ACTIVE | Noted: 2025-07-02

## 2025-07-02 PROBLEM — Z3A.35 35 WEEKS GESTATION OF PREGNANCY: Status: ACTIVE | Noted: 2025-07-02

## 2025-07-02 PROCEDURE — 99212 OFFICE O/P EST SF 10 MIN: CPT | Performed by: PHYSICIAN ASSISTANT

## 2025-07-02 PROCEDURE — 99213 OFFICE O/P EST LOW 20 MIN: CPT

## 2025-07-02 PROCEDURE — 59025 FETAL NON-STRESS TEST: CPT | Performed by: PHYSICIAN ASSISTANT

## 2025-07-02 NOTE — PROGRESS NOTES
Progress Note - OB/GYN   Name: Melvi Morrison 35 y.o. female I MRN: [id number]  Unit/Bed#:  TRIAGE 2 I Date of Admission: 2025   Date of Service: 2025 I Hospital Day: 0     Assessment & Plan  Decreased fetal movement affecting management of mother, antepartum    35 weeks gestation of pregnancy      ASSESSMENT:    Melvi Morrison is a 35 y.o.  at 35w0d presents with decreased fetal movement.  NST was reactive.  JCARLOS 19.28 cm.  Pulse on recheck decreased to 100.  Discharged home with kick count precautions.    PLAN:    1) Decreased fetal movement       -NST reactive       - JCARLOS 19.28 cm       - Continue kick counts at home.     Discharge from OB triage with  labor precautions    - Reviewed rupture of membranes, false vs true labor, decreased fetal movement, and vaginal bleeding   - Pt to call provider with any concerns and follow up at her next scheduled prenatal appointment .   - Continue routine prenatal care   - Case discussed with Dr. Stevenson    SUBJECTIVE:    Melvi Morrison 35 y.o.  at 35w0d with an Estimated Date of Delivery: 25 who presents with decreased fetal movement since this morning. She denies contractions. She denies leakage of fluid or vaginal bleeding. She denies HA, CP, SOB, abdominal pain, and swelling.    OBJECTIVE:    Vitals:    25 0858   BP: 123/77   Pulse: (!) 128   Resp: 18   Temp: 97.8 °F (36.6 °C)   SpO2: 97%       ROS:  Constitutional: Negative  Respiratory: Negative  Cardiovascular: Negative    Gastrointestinal: Negative    General Physical Exam:  General: in no apparent distress and well developed and well nourished  Cardiovascular: Cor RRR  Lungs: non-labored breathing  Abdomen: abdomen is soft without significant tenderness, masses, organomegaly or guarding  Lower extremeties: nontender      Fetal monitoring:  FHT:  150 bpm/ Moderate 6 - 25 bpm / 15 x 15 accelerations present, no decelerations  [place]: contractions absent     Imaging:             Abd.  US   JCARLOS      - Q1 6.86 cm     - Q2 3.89 cm     - Q3 4.72 cm     - Q4 3.81 cm     - Total: 19.28 cm   Placenta: Anterior   Presentation: Vertex      Tonja Brito PA-C  7/2/2025 9:04 AM

## 2025-07-02 NOTE — TELEPHONE ENCOUNTER
"REASON FOR CONVERSATION: Decreased Fetal Movement    SYMPTOMS: Decreased fetal movement.    OTHER HEALTH INFORMATION: Patient is 35w, G1, calling with concerns for decreased fetal movement. Per patient, last night noted weaker movements than normal (\"not as strong\"), and this morning only felt 1 movement after eating about an hour ago (did kick counts for one hour after eating prior to call). Denies vaginal bleeding, leaking of fluid, contractions. Patient did note having some cramping last night which resolved on its own.     PROTOCOL DISPOSITION: Go to LD Now (Or to Office With PCP Approval)    CARE ADVICE PROVIDED: RN advised patient can try to complete 1 more hour of kick counts per protocol, or go to LD at [facility] for further evaluation/fetal monitoring. Patient would like to go to Labor and delivery for fetal monitoring. RN advised patient drive safe and pull over if notes severe symptoms such as painful cramping, vaginal bleeding or leaking of fluid and call 911.     ESC to provider on call, Dr. Stevenson, and LD Charge EDWARD Caraballo.    PRACTICE FOLLOW-UP: NA.      Reason for Disposition   Pregnant 23 or more weeks and baby moving less today AND unable (or unwilling) to perform kick count    Answer Assessment - Initial Assessment Questions  1. FETAL MOVEMENT: \"Has the baby's movement decreased or changed significantly from normal?\" (e.g., yes, no; describe)      Last night felt weaker movements (not as active); Last felt movement 1 hour ago; After eating. Decreased this morning - not moving as much as normal  2. ORIANA: \"What date are you expecting to deliver?\"       8/6/25  3. PREGNANCY: \"How many weeks pregnant are you?\"       35w  4. OTHER SYMPTOMS: \"Do you have any other symptoms?\" (e.g., abdominal pain, leaking fluid from vagina, vaginal bleeding, etc.)      Last night had some cramping, which went away    Protocols used: Pregnancy - Decreased Fetal Movement-ADULT-[place]    " -Wash hands before touching your baby.

## 2025-07-07 ENCOUNTER — ROUTINE PRENATAL (OUTPATIENT)
Dept: OBGYN CLINIC | Facility: CLINIC | Age: 35
End: 2025-07-07

## 2025-07-07 VITALS — WEIGHT: 208.2 LBS | DIASTOLIC BLOOD PRESSURE: 72 MMHG | SYSTOLIC BLOOD PRESSURE: 102 MMHG | BODY MASS INDEX: 33.6 KG/M2

## 2025-07-07 DIAGNOSIS — Z98.890 HISTORY OF LOOP ELECTROSURGICAL EXCISION PROCEDURE (LEEP) OF CERVIX AFFECTING PREGNANCY, ANTEPARTUM: ICD-10-CM

## 2025-07-07 DIAGNOSIS — Z34.03 ENCOUNTER FOR SUPERVISION OF NORMAL FIRST PREGNANCY IN THIRD TRIMESTER: Primary | ICD-10-CM

## 2025-07-07 DIAGNOSIS — Z36.9 ANTENATAL SCREENING ENCOUNTER: ICD-10-CM

## 2025-07-07 DIAGNOSIS — O34.40 HISTORY OF LOOP ELECTROSURGICAL EXCISION PROCEDURE (LEEP) OF CERVIX AFFECTING PREGNANCY, ANTEPARTUM: ICD-10-CM

## 2025-07-07 DIAGNOSIS — Z3A.35 35 WEEKS GESTATION OF PREGNANCY: ICD-10-CM

## 2025-07-07 PROCEDURE — 87591 N.GONORRHOEAE DNA AMP PROB: CPT | Performed by: PHYSICIAN ASSISTANT

## 2025-07-07 PROCEDURE — 87491 CHLMYD TRACH DNA AMP PROBE: CPT | Performed by: PHYSICIAN ASSISTANT

## 2025-07-07 PROCEDURE — PNV: Performed by: PHYSICIAN ASSISTANT

## 2025-07-07 PROCEDURE — 87150 DNA/RNA AMPLIFIED PROBE: CPT | Performed by: PHYSICIAN ASSISTANT

## 2025-07-07 NOTE — PROGRESS NOTES
35 y.o.  female at 35w5d (Estimated Date of Delivery: 25) for PNV.    Pre- Vitals      Flowsheet Row Most Recent Value   Prenatal Assessment    Movement Present   Prenatal Vitals    Blood Pressure 102/72   Weight - Scale 94.4 kg (208 lb 3.2 oz)   Urine Albumin/Glucose    Dilation/Effacement/Station    Vaginal Drainage    Edema           TW.2 kg (29 lb 3.2 oz)    Leakage of fluid: no  Vaginal bleeding: no  Contractions/Cramping: no  Fetal movement: yes  Pt is feeling well overall   GBS done today    RTO in 1 weeks.

## 2025-07-08 LAB
C TRACH DNA SPEC QL NAA+PROBE: NEGATIVE
N GONORRHOEA DNA SPEC QL NAA+PROBE: NEGATIVE

## 2025-07-09 LAB — GP B STREP DNA SPEC QL NAA+PROBE: POSITIVE

## 2025-07-16 ENCOUNTER — ROUTINE PRENATAL (OUTPATIENT)
Dept: OBGYN CLINIC | Facility: CLINIC | Age: 35
End: 2025-07-16

## 2025-07-16 VITALS — DIASTOLIC BLOOD PRESSURE: 78 MMHG | SYSTOLIC BLOOD PRESSURE: 112 MMHG | BODY MASS INDEX: 33.41 KG/M2 | WEIGHT: 207 LBS

## 2025-07-16 DIAGNOSIS — Z3A.37 37 WEEKS GESTATION OF PREGNANCY: ICD-10-CM

## 2025-07-16 DIAGNOSIS — Z34.03 ENCOUNTER FOR SUPERVISION OF NORMAL FIRST PREGNANCY IN THIRD TRIMESTER: Primary | ICD-10-CM

## 2025-07-16 PROCEDURE — PNV: Performed by: OBSTETRICS & GYNECOLOGY

## 2025-07-16 NOTE — PROGRESS NOTES
Full Term Visit  Name: Melvi Morrison      : 1990      MRN: [id number]  Encounter Provider: Elodia Hoang MD  Encounter Date: 2025   Encounter department: [facility]    35 y.o.  at 37w0d presenting for routine OB visit at 37w0d.:  Assessment & Plan  Encounter for supervision of normal first pregnancy in third trimester         37 weeks gestation of pregnancy  GBS positive results reviewed with patient. Discussed recommendations for prophylactic antibiotics during labor course.   Patient interested in 39wk IOL. Discussed IOL procedure and cervical ripening methods including blue balloon and misoprostol, pitocin titration, and AROM.   Reviewed history of LEEP procedure for patient and risks of scar tissue impeding labor progress as being low.            Reviewed labor precautions and fetal kick counts as well as pre-eclampsia warning signs.  Reviewed perineal massage for decreasing risk of perineal lacerations during delivery.  Advised to continue medications and return in 1 week.    History of Present Illness     She reports no complaints.   She request cervical check today   Denies uterine contractions.  Denies vaginal bleeding or leaking of fluid.  Reports adequate fetal movement of at least 10 movements in 2 hours once daily.     Current Outpatient Medications   Medication Instructions    Prenatal MV-Min-Fe Fum-FA-DHA (PRENATAL 1 PO) 1 tablet, Daily     Objective   /78   Wt 93.9 kg (207 lb)   LMP 10/30/2024 (Exact Date)   BMI 33.41 kg/m²      BP: Blood Pressure: 112/78  Wt: 93.9 kg (207 lb); Body mass index is 33.41 kg/m².; TWG=12.7 kg (28 lb)  Fetal Heart Rate: 137;    SVE today: Cervical Dilation: 1 /Cervical Effacement: 50 /Fetal Station: Ballotable  Vertex presentation confirmed by abdominal US.   Abdomen: gravid , ”soft”,”non tender”

## 2025-07-16 NOTE — ASSESSMENT & PLAN NOTE
GBS positive results reviewed with patient. Discussed recommendations for prophylactic antibiotics during labor course.   Patient interested in 39wk IOL. Discussed IOL procedure and cervical ripening methods including blue balloon and misoprostol, pitocin titration, and AROM.   Reviewed history of LEEP procedure for patient and risks of scar tissue impeding labor progress as being low.

## 2025-07-16 NOTE — PATIENT INSTRUCTIONS
Am I in labour?    As you enter the final stages of your pregnancy, your body will give signs that labour is approaching. The following information should help you to understand these signs and make it easier for you to determine whether you are in labour.    Some of the signs and symptoms of going into labour may include:    period-like cramps  backache  diarrhoea  mucous discharge or ‘show’  gush or trickle of water as the membranes break  contractions  Engagement  As you move closer to delivery, your baby’s head may drop and become engaged in your pelvis in preparation for labour. If you are expecting your first baby, you may notice pressure in your groin and on your bladder beginning up to four weeks before the birth. You may also notice that you can breathe a little easier and have a little more appetite as your baby drops, and is not pushed up against your diaphragm and stomach quite so much. This is sometimes known as “lightening”, as women generally feel lighter.    Show    During your pregnancy a mucous plug fills your cervix. Towards the end of pregnancy, the cervix becomes softer and this mucous plug may become loosened and start to come away. The process of discharging this mucous is called a ‘show’ and might often contain streaks of blood or may also be brownish in colour. This is different from any flow of fresh blood which you would report immediately to your doctor or the hospital. The show may continue over a period of hours or even days. It is one of the signs that your body is preparing for birth. Labour may begin in the next few days, hours or weeks following a show. There is no need to phone the hospital if you have had a show.    Water breaking (rupture of membranes)    This may occur at any time prior to the start of labour, or at any time during labour. The break may be low, near the opening of the uterus, and will produce a gush of amniotic fluid. If this occurs, place a sanitary pad on and  "note the colour of the fluid. Ring the hospital and tell the midwife that this has occurred. You will usually be asked to come in to the hospital.    Another type of amniotic fluid leak may occur higher up in the amniotic sack, or top of the uterus. This will be less obvious to you and you may only notice a trickle of fluid. Since many women have a heavy vaginal discharge or leak a small amount of urine towards the end of their pregnancy and it is often difficult to tell the difference between urine and amniotic fluid - urine is often yellow; where amniotic fluid is usually clear, or has a pink tinge, and has a \"sweet\" odour. If you are unsure, ring the hospital.    If the colour of the fluid is green or brownish, it indicates that your baby has passed a bowel motion (meconium) inside the uterus. It is very common to have meconium-stained amniotic fluid in a pregnancy over 41 weeks, but this may also be a sign that your baby is distressed. You will need to ring the hospital immediately and then come into the hospital.    Contractions    [place] contractions  [place] contractions are sometimes mistaken for labour. These “practice” contractions usually start MCFP through the pregnancy and continue right through to the end. These contractions are often irregular and can be uncomfortable and tight. [place] contractions usually increase in regularity and strength towards the end of your pregnancy, preparing your uterus for the birth. Sometimes it is difficult to distinguish between these [place] contractions and labour contractions. Below are the common differences between the two.    Labour contractions  True labour contractions usually increase in strength and duration. In order to time your contractions, time the interval between the start of one contraction to the start of the next. Early labour contractions are often likened to period cramps with or without backache.    [place] " contractions    Labour contractions    usually irregular and short    become regular with time    do not get closer together    get closer together with time    do not get stronger     become stronger    walking does not make them stronger  walking can make them stronger    lying down may make them go away   lying down does not make them go away    uncomfortable and tight--not painful   Painful - can't walk, talk or breathe through them        How does labour start?    Labour can start in different ways. You may be start experiencing some period like pains or contractions. You might notice that these tightenings/contractions start to get stronger, closer and last longer than before. Or you might start with some back ache or a stomach upset that gets stronger and develops into regular contractions. In approximately 10-15% of women, labour will start when your membranes rupture. Contractions usually follow.    Should I call my doctor?    You should call your doctor at [phone] when:    - your sampson break  - you have bright blood loss  - your contractions are regular and five minutes apart  - if you have decreased fetal movement

## 2025-07-24 ENCOUNTER — ROUTINE PRENATAL (OUTPATIENT)
Dept: OBGYN CLINIC | Facility: CLINIC | Age: 35
End: 2025-07-24

## 2025-07-24 VITALS — DIASTOLIC BLOOD PRESSURE: 76 MMHG | WEIGHT: 209.6 LBS | SYSTOLIC BLOOD PRESSURE: 124 MMHG | BODY MASS INDEX: 33.83 KG/M2

## 2025-07-24 DIAGNOSIS — Z98.890 HISTORY OF LOOP ELECTROSURGICAL EXCISION PROCEDURE (LEEP) OF CERVIX AFFECTING PREGNANCY, ANTEPARTUM: Primary | ICD-10-CM

## 2025-07-24 DIAGNOSIS — Z3A.38 38 WEEKS GESTATION OF PREGNANCY: ICD-10-CM

## 2025-07-24 DIAGNOSIS — O34.40 HISTORY OF LOOP ELECTROSURGICAL EXCISION PROCEDURE (LEEP) OF CERVIX AFFECTING PREGNANCY, ANTEPARTUM: Primary | ICD-10-CM

## 2025-07-24 PROBLEM — Z3A.33 33 WEEKS GESTATION OF PREGNANCY: Status: RESOLVED | Noted: 2025-01-19 | Resolved: 2025-07-24

## 2025-07-24 PROCEDURE — PNV: Performed by: OBSTETRICS & GYNECOLOGY

## 2025-07-24 NOTE — PROGRESS NOTES
Full Term Visit  Name: Melvi Morrison      : 1990      MRN: [id number]  Encounter Provider: Chantell Lares MD  Encounter Date: 2025   Encounter department: [facility]    35 y.o.  at 38w1d presenting for routine OB visit at 38w1d.:  Assessment & Plan  38 weeks gestation of pregnancy         History of loop electrosurgical excision procedure (LEEP) of cervix affecting pregnancy, antepartum         Feeling well.  No complaints  Preferred IOL near 40 wks if still preg  SVE /-2    Reviewed labor precautions and fetal kick counts as well as pre-eclampsia warning signs.  Reviewed perineal massage for decreasing risk of perineal lacerations during delivery.  Advised to continue medications and return in 1 week.    History of Present Illness     She reports no concerns and feeling.  Patient would like a cervical check.  Denies uterine contractions.  Denies vaginal bleeding or leaking of fluid.   Reports adequate fetal movement of at least 10 movements in 2 hours once daily.         Current Outpatient Medications   Medication Instructions    Prenatal MV-Min-Fe Fum-FA-DHA (PRENATAL 1 PO) 1 tablet, Daily     Objective   /76   Wt 95.1 kg (209 lb 9.6 oz)   LMP 10/30/2024 (Exact Date)   BMI 33.83 kg/m²      BP: Blood Pressure: 124/76  Wt: 95.1 kg (209 lb 9.6 oz); Body mass index is 33.83 kg/m².; TWG=13.9 kg (30 lb 9.6 oz)   ;    SVE today: Cervical Dilation: 1 /Cervical Effacement: 50 /Fetal Station: -2      Pregnancy Problems (from 24 to present)       Problem Noted Diagnosed Resolved    33 weeks gestation of pregnancy 2025 by DREW Rosales  No

## 2025-07-26 ENCOUNTER — NURSE TRIAGE (OUTPATIENT)
Dept: OTHER | Facility: OTHER | Age: 35
End: 2025-07-26

## 2025-07-26 NOTE — TELEPHONE ENCOUNTER
"REASON FOR CONVERSATION: Fall    SYMPTOMS: leg and knee pain after fall    OTHER HEALTH INFORMATION: n/a    PROTOCOL DISPOSITION: Home Care    CARE ADVICE PROVIDED: Per on call provider, patient can monitor at symptoms at home. If she has contractions or belly pain or decrease movement or any other concerns, then she should come to triage. Pt verbalized understanding.     PRACTICE FOLLOW-UP: none          Reason for Disposition   Slipped or lost balance, but did not fall to ground or floor    Answer Assessment - Initial Assessment Questions  1. MECHANISM: \"How did the fall happen?\"        Coming out of shower, went down stairs and she missed the last two steps. Causing her to fall into wall, then fell to her knees and buttocks.     2. DOMESTIC VIOLENCE SCREENING: \"Did you fall because someone pushed you or tried to hurt you?\"        No    3. ONSET: \"When did the fall happen?\" (e.g., minutes, hours, or days ago)        7/26 at 920am     4. LOCATION: \"What part of the body hit the ground?\" (e.g., back, buttocks, head, hips, knees, hands, head, stomach)        Leg and knee    5. INJURY: \"Did you get hurt when you fell? Are there any obvious injuries?\" If Yes, ask: \"What does the injury look like?\"        No    6. PAIN: \"Is there any pain?\" If Yes, ask: \"How bad is the pain?\" (e.g., Scale 1-10; or mild,         Mild pain in leg and knee    7. SIZE: For cuts, bruises, or swelling, ask: \"How large is it?\" (e.g., inches or centimeters)        N/a    8. ORIANA: \"What date are you expecting to deliver?\"        8/6/25    9. FETAL MOVEMENT: \"Has the baby's movement decreased or changed significantly from         Has had good FM since fall    10. TETANUS: For any breaks in the skin, ask: \"When was the last tetanus booster?\"          N/a    11. OTHER SYMPTOMS: \"Do you have any other symptoms?\" (e.g., abdomen pain, contractions, leaking of fluid from vagina or concerned water broke, vaginal bleeding)          No    Protocols used: " Pregnancy - Fall-Adult-AH

## 2025-07-26 NOTE — TELEPHONE ENCOUNTER
"Regardinw pregnant/fall/no decreased fetal movement  ----- Message from Jayda MERCEDES sent at 2025  9:59 AM EDT -----  \"I am 38w pregnant and I was going down the stairs and missed the last 2 steps I fell into the wall and onto my knees then my buttocks. I did not hit my belly and I have felt her move 1-2 time since it happened. I am not sure if I need to go in to be seen or not.\"    "

## 2025-07-30 ENCOUNTER — ROUTINE PRENATAL (OUTPATIENT)
Dept: OBGYN CLINIC | Facility: CLINIC | Age: 35
End: 2025-07-30

## 2025-07-30 VITALS — BODY MASS INDEX: 34.38 KG/M2 | SYSTOLIC BLOOD PRESSURE: 124 MMHG | DIASTOLIC BLOOD PRESSURE: 86 MMHG | WEIGHT: 213 LBS

## 2025-07-30 DIAGNOSIS — O34.40 HISTORY OF LOOP ELECTROSURGICAL EXCISION PROCEDURE (LEEP) OF CERVIX AFFECTING PREGNANCY, ANTEPARTUM: Primary | ICD-10-CM

## 2025-07-30 DIAGNOSIS — Z3A.39 39 WEEKS GESTATION OF PREGNANCY: ICD-10-CM

## 2025-07-30 DIAGNOSIS — Z98.890 HISTORY OF LOOP ELECTROSURGICAL EXCISION PROCEDURE (LEEP) OF CERVIX AFFECTING PREGNANCY, ANTEPARTUM: Primary | ICD-10-CM

## 2025-07-30 PROCEDURE — PNV: Performed by: OBSTETRICS & GYNECOLOGY

## 2025-08-01 ENCOUNTER — HOSPITAL ENCOUNTER (OUTPATIENT)
Dept: LABOR AND DELIVERY | Facility: HOSPITAL | Age: 35
Discharge: HOME | End: 2025-08-01
Payer: COMMERCIAL

## 2025-08-01 ENCOUNTER — HOSPITAL ENCOUNTER (INPATIENT)
Facility: HOSPITAL | Age: 35
LOS: 3 days | Discharge: HOME | End: 2025-08-04
Attending: OBSTETRICS & GYNECOLOGY | Admitting: OBSTETRICS & GYNECOLOGY
Payer: COMMERCIAL

## 2025-08-01 PROBLEM — Z3A.39 39 WEEKS GESTATION OF PREGNANCY: Status: ACTIVE | Noted: 2025-07-02

## 2025-08-02 ENCOUNTER — ANESTHESIA EVENT (INPATIENT)
Dept: ANESTHESIOLOGY | Facility: HOSPITAL | Age: 35
End: 2025-08-02
Payer: COMMERCIAL

## 2025-08-02 ENCOUNTER — ANESTHESIA (INPATIENT)
Dept: ANESTHESIOLOGY | Facility: HOSPITAL | Age: 35
End: 2025-08-02
Payer: COMMERCIAL

## 2025-08-02 RX ORDER — LIDOCAINE HYDROCHLORIDE AND EPINEPHRINE 15; 5 MG/ML; UG/ML
INJECTION, SOLUTION EPIDURAL
Status: COMPLETED | OUTPATIENT
Start: 2025-08-02 | End: 2025-08-02

## 2025-08-02 RX ADMIN — LIDOCAINE HYDROCHLORIDE AND EPINEPHRINE 2 ML: 15; 5 INJECTION, SOLUTION EPIDURAL at 08:56

## 2025-08-02 RX ADMIN — LIDOCAINE HYDROCHLORIDE AND EPINEPHRINE 3 ML: 15; 5 INJECTION, SOLUTION EPIDURAL at 08:54

## 2025-08-04 PROBLEM — Z3A.39 39 WEEKS GESTATION OF PREGNANCY: Status: RESOLVED | Noted: 2025-07-02 | Resolved: 2025-08-04

## 2025-08-06 ENCOUNTER — OFFICE VISIT (OUTPATIENT)
Dept: POSTPARTUM | Facility: CLINIC | Age: 35
End: 2025-08-06
Payer: COMMERCIAL

## 2025-08-06 PROCEDURE — 99404 PREV MED CNSL INDIV APPRX 60: CPT | Performed by: PEDIATRICS

## 2025-08-07 ENCOUNTER — TELEPHONE (OUTPATIENT)
Dept: OBGYN CLINIC | Facility: CLINIC | Age: 35
End: 2025-08-07

## 2025-08-20 ENCOUNTER — OFFICE VISIT (OUTPATIENT)
Dept: POSTPARTUM | Facility: CLINIC | Age: 35
End: 2025-08-20
Payer: COMMERCIAL

## 2025-08-20 PROCEDURE — 99404 PREV MED CNSL INDIV APPRX 60: CPT | Performed by: PEDIATRICS
